# Patient Record
Sex: FEMALE | Race: BLACK OR AFRICAN AMERICAN | NOT HISPANIC OR LATINO
[De-identification: names, ages, dates, MRNs, and addresses within clinical notes are randomized per-mention and may not be internally consistent; named-entity substitution may affect disease eponyms.]

---

## 2017-08-01 ENCOUNTER — APPOINTMENT (OUTPATIENT)
Dept: PULMONOLOGY | Facility: CLINIC | Age: 57
End: 2017-08-01

## 2017-10-03 ENCOUNTER — APPOINTMENT (OUTPATIENT)
Dept: PULMONOLOGY | Facility: CLINIC | Age: 57
End: 2017-10-03
Payer: COMMERCIAL

## 2017-10-03 VITALS
BODY MASS INDEX: 24.86 KG/M2 | TEMPERATURE: 98.3 F | DIASTOLIC BLOOD PRESSURE: 70 MMHG | HEIGHT: 68 IN | SYSTOLIC BLOOD PRESSURE: 130 MMHG | HEART RATE: 78 BPM | WEIGHT: 164 LBS | OXYGEN SATURATION: 95 % | RESPIRATION RATE: 18 BRPM

## 2017-10-03 DIAGNOSIS — K22.8 OTHER SPECIFIED DISEASES OF ESOPHAGUS: ICD-10-CM

## 2017-10-03 PROCEDURE — 99215 OFFICE O/P EST HI 40 MIN: CPT

## 2017-10-05 ENCOUNTER — MOBILE ON CALL (OUTPATIENT)
Age: 57
End: 2017-10-05

## 2017-10-06 ENCOUNTER — CHART COPY (OUTPATIENT)
Age: 57
End: 2017-10-06

## 2017-10-30 ENCOUNTER — APPOINTMENT (OUTPATIENT)
Dept: CV DIAGNOSITCS | Facility: HOSPITAL | Age: 57
End: 2017-10-30

## 2017-10-30 ENCOUNTER — OUTPATIENT (OUTPATIENT)
Dept: OUTPATIENT SERVICES | Facility: HOSPITAL | Age: 57
LOS: 1 days | End: 2017-10-30
Payer: COMMERCIAL

## 2017-10-30 DIAGNOSIS — M34.9 SYSTEMIC SCLEROSIS, UNSPECIFIED: ICD-10-CM

## 2017-10-30 PROCEDURE — 93306 TTE W/DOPPLER COMPLETE: CPT | Mod: 26

## 2017-11-06 ENCOUNTER — APPOINTMENT (OUTPATIENT)
Dept: RADIOLOGY | Facility: CLINIC | Age: 57
End: 2017-11-06
Payer: COMMERCIAL

## 2017-11-06 ENCOUNTER — OUTPATIENT (OUTPATIENT)
Dept: OUTPATIENT SERVICES | Facility: HOSPITAL | Age: 57
LOS: 1 days | End: 2017-11-06
Payer: COMMERCIAL

## 2017-11-06 ENCOUNTER — APPOINTMENT (OUTPATIENT)
Dept: CT IMAGING | Facility: CLINIC | Age: 57
End: 2017-11-06
Payer: COMMERCIAL

## 2017-11-06 DIAGNOSIS — M34.9 SYSTEMIC SCLEROSIS, UNSPECIFIED: ICD-10-CM

## 2017-11-06 DIAGNOSIS — Z00.8 ENCOUNTER FOR OTHER GENERAL EXAMINATION: ICD-10-CM

## 2017-11-06 PROCEDURE — 72100 X-RAY EXAM L-S SPINE 2/3 VWS: CPT | Mod: 26

## 2017-11-06 PROCEDURE — 72070 X-RAY EXAM THORAC SPINE 2VWS: CPT | Mod: 26

## 2017-11-06 PROCEDURE — 71100 X-RAY EXAM RIBS UNI 2 VIEWS: CPT | Mod: 26,RT

## 2017-11-06 PROCEDURE — 72100 X-RAY EXAM L-S SPINE 2/3 VWS: CPT

## 2017-11-06 PROCEDURE — 71100 X-RAY EXAM RIBS UNI 2 VIEWS: CPT

## 2017-11-06 PROCEDURE — 71250 CT THORAX DX C-: CPT | Mod: 26

## 2017-11-06 PROCEDURE — 72070 X-RAY EXAM THORAC SPINE 2VWS: CPT

## 2017-11-06 PROCEDURE — 71250 CT THORAX DX C-: CPT

## 2017-11-10 DIAGNOSIS — R91.8 OTHER NONSPECIFIC ABNORMAL FINDING OF LUNG FIELD: ICD-10-CM

## 2017-11-10 DIAGNOSIS — M51.37 OTHER INTERVERTEBRAL DISC DEGENERATION, LUMBOSACRAL REGION: ICD-10-CM

## 2017-11-10 DIAGNOSIS — M43.16 SPONDYLOLISTHESIS, LUMBAR REGION: ICD-10-CM

## 2017-11-10 DIAGNOSIS — R07.81 PLEURODYNIA: ICD-10-CM

## 2017-11-10 DIAGNOSIS — M54.9 DORSALGIA, UNSPECIFIED: ICD-10-CM

## 2018-01-02 ENCOUNTER — APPOINTMENT (OUTPATIENT)
Dept: PULMONOLOGY | Facility: CLINIC | Age: 58
End: 2018-01-02

## 2018-04-27 ENCOUNTER — APPOINTMENT (OUTPATIENT)
Dept: PULMONOLOGY | Facility: CLINIC | Age: 58
End: 2018-04-27
Payer: COMMERCIAL

## 2018-04-27 VITALS
TEMPERATURE: 98.1 F | HEIGHT: 68 IN | WEIGHT: 168 LBS | SYSTOLIC BLOOD PRESSURE: 110 MMHG | BODY MASS INDEX: 25.46 KG/M2 | DIASTOLIC BLOOD PRESSURE: 80 MMHG

## 2018-04-27 PROCEDURE — 94729 DIFFUSING CAPACITY: CPT

## 2018-04-27 PROCEDURE — 99215 OFFICE O/P EST HI 40 MIN: CPT | Mod: 25

## 2018-04-27 PROCEDURE — 94060 EVALUATION OF WHEEZING: CPT

## 2018-04-27 PROCEDURE — 94726 PLETHYSMOGRAPHY LUNG VOLUMES: CPT

## 2018-04-27 PROCEDURE — ZZZZZ: CPT

## 2018-05-16 ENCOUNTER — APPOINTMENT (OUTPATIENT)
Dept: CARDIOLOGY | Facility: CLINIC | Age: 58
End: 2018-05-16
Payer: COMMERCIAL

## 2018-05-16 ENCOUNTER — NON-APPOINTMENT (OUTPATIENT)
Age: 58
End: 2018-05-16

## 2018-05-16 VITALS
WEIGHT: 173 LBS | DIASTOLIC BLOOD PRESSURE: 84 MMHG | BODY MASS INDEX: 26.22 KG/M2 | OXYGEN SATURATION: 98 % | SYSTOLIC BLOOD PRESSURE: 149 MMHG | HEART RATE: 73 BPM | HEIGHT: 68 IN

## 2018-05-16 DIAGNOSIS — Z82.49 FAMILY HISTORY OF ISCHEMIC HEART DISEASE AND OTHER DISEASES OF THE CIRCULATORY SYSTEM: ICD-10-CM

## 2018-05-16 DIAGNOSIS — I10 ESSENTIAL (PRIMARY) HYPERTENSION: ICD-10-CM

## 2018-05-16 PROCEDURE — 99205 OFFICE O/P NEW HI 60 MIN: CPT

## 2018-05-16 PROCEDURE — 93000 ELECTROCARDIOGRAM COMPLETE: CPT

## 2018-11-02 ENCOUNTER — APPOINTMENT (OUTPATIENT)
Dept: PULMONOLOGY | Facility: CLINIC | Age: 58
End: 2018-11-02
Payer: COMMERCIAL

## 2018-11-02 VITALS
DIASTOLIC BLOOD PRESSURE: 83 MMHG | OXYGEN SATURATION: 100 % | RESPIRATION RATE: 16 BRPM | BODY MASS INDEX: 25.46 KG/M2 | SYSTOLIC BLOOD PRESSURE: 128 MMHG | HEIGHT: 68 IN | TEMPERATURE: 97.7 F | HEART RATE: 76 BPM | WEIGHT: 168 LBS

## 2018-11-02 PROCEDURE — ZZZZZ: CPT

## 2018-11-02 PROCEDURE — 94618 PULMONARY STRESS TESTING: CPT

## 2018-11-02 PROCEDURE — 99215 OFFICE O/P EST HI 40 MIN: CPT | Mod: 25

## 2018-12-12 ENCOUNTER — APPOINTMENT (OUTPATIENT)
Dept: CT IMAGING | Facility: CLINIC | Age: 58
End: 2018-12-12
Payer: COMMERCIAL

## 2018-12-12 ENCOUNTER — OUTPATIENT (OUTPATIENT)
Dept: OUTPATIENT SERVICES | Facility: HOSPITAL | Age: 58
LOS: 1 days | End: 2018-12-12
Payer: COMMERCIAL

## 2018-12-12 DIAGNOSIS — M34.9 SYSTEMIC SCLEROSIS, UNSPECIFIED: ICD-10-CM

## 2018-12-12 PROCEDURE — 71250 CT THORAX DX C-: CPT

## 2018-12-12 PROCEDURE — 71250 CT THORAX DX C-: CPT | Mod: 26

## 2018-12-13 ENCOUNTER — TRANSCRIPTION ENCOUNTER (OUTPATIENT)
Age: 58
End: 2018-12-13

## 2019-03-02 ENCOUNTER — TRANSCRIPTION ENCOUNTER (OUTPATIENT)
Age: 59
End: 2019-03-02

## 2019-03-05 ENCOUNTER — APPOINTMENT (OUTPATIENT)
Dept: PULMONOLOGY | Facility: CLINIC | Age: 59
End: 2019-03-05
Payer: COMMERCIAL

## 2019-03-05 VITALS
RESPIRATION RATE: 15 BRPM | OXYGEN SATURATION: 98 % | DIASTOLIC BLOOD PRESSURE: 79 MMHG | TEMPERATURE: 97.2 F | BODY MASS INDEX: 26 KG/M2 | SYSTOLIC BLOOD PRESSURE: 122 MMHG | HEART RATE: 80 BPM | WEIGHT: 171 LBS

## 2019-03-05 DIAGNOSIS — R00.2 PALPITATIONS: ICD-10-CM

## 2019-03-05 DIAGNOSIS — E03.9 HYPOTHYROIDISM, UNSPECIFIED: ICD-10-CM

## 2019-03-05 PROCEDURE — ZZZZZ: CPT

## 2019-03-05 PROCEDURE — 36415 COLL VENOUS BLD VENIPUNCTURE: CPT

## 2019-03-05 PROCEDURE — 94010 BREATHING CAPACITY TEST: CPT

## 2019-03-05 PROCEDURE — 94729 DIFFUSING CAPACITY: CPT

## 2019-03-05 PROCEDURE — 99215 OFFICE O/P EST HI 40 MIN: CPT | Mod: 25

## 2019-03-05 PROCEDURE — 94726 PLETHYSMOGRAPHY LUNG VOLUMES: CPT

## 2019-03-05 NOTE — REVIEW OF SYSTEMS
[Dry Mouth] : dry mouth [As Noted in HPI] : as noted in HPI [Heartburn] : heartburn [Reflux] : reflux [Myalgias] : myalgias [Arthralgias] : arthralgias [Raynaud] : Raynaud's phenomenon was observed [Scleroderma] : scleroderma [Negative] : Sleep Disorder [Postnasal Drip] : no postnasal drip [Sinus Problems] : no sinus problems [Mouth Ulcers] : no mouth ulcers [Cough] : no cough [PND] : no PND [Orthopnea] : no orthopnea [Edema] : ~T edema was not present

## 2019-03-05 NOTE — REASON FOR VISIT
[Follow-Up] : a follow-up visit [Abnormal CT Scan] : abnormal CT Scan [Shortness of Breath] : shortness of Breath [FreeTextEntry1] : scleroderma [FreeTextEntry2] : Scleroderma

## 2019-03-05 NOTE — DISCUSSION/SUMMARY
[FreeTextEntry1] : -------ASSESSMENT AND PLAN---58 F with scleroderma, GERD , here for evaluation of shortness of breath for pulmonary manifestations of scleroderma also hypothyroidism-..\par 1 Sclerdoerma--follow up with Dr Goldberg\par 2- previous  CT chest unchanged- mild ILD- repeat yearly ---needs  ct scan in 2019\par 3- Hypothyroidism--on armour thyroid  f/u  endocrinology----DR. LAITH VILLASEÑOR----\par 4- repeat echo \par \par 5- labs drawn today\par 6- obtain egd report from Dr Bose\par 7- f/u in 6 m\par \par ------Thanks for allowing  me to participate  in the care of this patient.  Patient at this time  will follow  the above mentioned recommendations and return back for follow up visit. If you have any questions  I can be reached  at [phone]  or  [phone].  \par \par Malu Villaseñor MD, FCCP \par Director, Pulmonary Hypertension Program \par [facility] \par Division of Pulmonary, Critical Care and Sleep Medicine \par  Professor of Medicine \par [employer]\par

## 2019-03-05 NOTE — HISTORY OF PRESENT ILLNESS
[Stable] : are stable [None] : ~He/She~ has no significant interval events [Difficulty Breathing During Exertion] : stable dyspnea on exertion [Feelings Of Weakness On Exertion] : stable exercise intolerance [Cough] : denies coughing [Wheezing] : denies wheezing [Regional Soft Tissue Swelling Both Lower Extremities] : denies lower extremity edema [Chest Pain Or Discomfort] : denies chest pain [Fever] : denies fever [0  -  Nothing at all] : 0, nothing at all [Date: ___] : was performed [unfilled] [Oxygen] : the patient uses no supplemental oxygen [de-identified] : FVC 72, FEV1 71, FEV1/FVC 78, , DLCO 72 [FreeTextEntry1] : ---------58 F with scleroderma ( diagnosed 2008, follows with Dr. Goldberg, not on any treatment), HTN, sickle cell trait here for initial evaluation. ---SHE USED TO F/U  WITH DR PATIÑO  BEFORE  BUT HAS NOT FOLLOWWED UP WITH HIM  FOR A WHILE-   Her scleroderma has been associated with skin manifestations including digital ulcers and Raynaud's, GERD , and now possible pulmonary manifestations. She takes nifedipine for raynaud's and vitamins. She reports pedal edema occasionally. denies orthopnea, PND, cough. -----H/O ANTI ENDOMYSIAL ANTIBODIES POSITIVE?CELAIC ---------------------------SEEING  A 'HOLISTIC IMMUNOLOGIST' \par She has LIMITED exercise capacity mainly due to joint pains and myalgias she reports.---------------\par CT chest Jan 2014 - Minimal subpleural ground glass attenuation in b/l lower lung fields, rt lung calcified granuloma, RML 4mm nodule and 2.6 cm thyroid nodule.-------------\par \par ct chest 12/2108 unchanged-\par \par PFTs---------APR 2018------ FVC 2.54, FEV1 1.85, FEV1/FVC 73, ----------mildly reduced DLCO, normal spirometry , increased RV. \par \par pft 3/2019 normal lung volumes, decreased  dlco\par \par Of note, pt has not followed with Dr. martino for scleroderma. She reports using "holistic medicines" to treat her scleroderma. \par PMD - Dr. Sin, \par \par ECHO   -----OCT 2017--------\par . Normal trileaflet aortic valve.\par 2. Increased relative wall thickness with normal left\par ventricular mass index, consistent with concentric left\par ventricular remodeling.\par 3. Normal left ventricular systolic function. No segmental\par wall motion abnormalities.\par 4. Normal right ventricular size and function.\par \par \par -----9/2017---ENDOSCOPY --SCHATZKI'S  RING, , ESOPHAGEAL OBSTRUCTION.[ DR SUZANNE GUERIN] \par oct 2017 doing well-mild ORTIZ and mild dry cough\par declines influenza vac\par \par \par \par march 2019  Scleroderma- follows rheum Dr Goldberg. -FEELS  THE SAME  ---SLIGHT PALPITATIONS AT TIMES s/p holter  Hypothyroid--  ON ARMOUR THYROID] Follows endo Dr Dionne De Paz

## 2019-03-05 NOTE — LETTER CLOSING
[Sincerely,] : Sincerely, [Malu De Paz MD, FCCP] : Malu De Paz MD, FCCP [Director, Pulmonary Hypertension Program] : Director, Pulmonary Hypertension Program [[facility]] : [facility] [Division of Pulmonary, Critical Care and Sleep Medicine] : Division of Pulmonary, Critical Care and Sleep Medicine [Associate Professor of Medicine] : Associate Professor of Medicine [[facility]] : [facility]

## 2019-03-05 NOTE — PHYSICAL EXAM
[General Appearance - Well Developed] : well developed [Normal Appearance] : normal appearance [Well Groomed] : well groomed [General Appearance - Well Nourished] : well nourished [No Deformities] : no deformities [General Appearance - In No Acute Distress] : no acute distress [Normal Conjunctiva] : the conjunctiva exhibited no abnormalities [II] : II [Jugular Venous Distention Increased] : there was no jugular-venous distention [Heart Sounds] : normal S1 and S2 [Murmurs] : no murmurs present [Respiration, Rhythm And Depth] : normal respiratory rhythm and effort [Exaggerated Use Of Accessory Muscles For Inspiration] : no accessory muscle use [Lesions: ____] : lesions [unfilled] [Abdomen Soft] : soft [Abdomen Tenderness] : non-tender [Abnormal Walk] : normal gait [Gait - Sufficient For Exercise Testing] : the gait was sufficient for exercise testing [Skin Color & Pigmentation] : normal skin color and pigmentation [] : no rash [No Venous Stasis] : no venous stasis [Skin Lesions] : no skin lesions [No Skin Ulcers] : no skin ulcer [No Xanthoma] : no  xanthoma was observed [Deep Tendon Reflexes (DTR)] : deep tendon reflexes were 2+ and symmetric [Sensation] : the sensory exam was normal to light touch and pinprick [No Focal Deficits] : no focal deficits [Impaired Insight] : insight and judgment were intact [Nail Clubbing] : no clubbing of the fingernails [Oriented To Time, Place, And Person] : oriented to person, place, and time [Affect] : the affect was normal [Mood] : the mood was normal [FreeTextEntry1] : narrowed mouth opening, skin tightening

## 2019-03-06 LAB
ALBUMIN SERPL ELPH-MCNC: 4.3 G/DL
ALP BLD-CCNC: 64 U/L
ALT SERPL-CCNC: 16 U/L
ANION GAP SERPL CALC-SCNC: 11 MMOL/L
AST SERPL-CCNC: 22 U/L
BASOPHILS # BLD AUTO: 0.05 K/UL
BASOPHILS NFR BLD AUTO: 0.8 %
BILIRUB SERPL-MCNC: 0.2 MG/DL
BUN SERPL-MCNC: 15 MG/DL
CALCIUM SERPL-MCNC: 9.4 MG/DL
CHLORIDE SERPL-SCNC: 105 MMOL/L
CO2 SERPL-SCNC: 26 MMOL/L
CREAT SERPL-MCNC: 0.56 MG/DL
DEPRECATED KAPPA LC FREE/LAMBDA SER: 1.2 RATIO
EOSINOPHIL # BLD AUTO: 0.09 K/UL
EOSINOPHIL NFR BLD AUTO: 1.5 %
FERRITIN SERPL-MCNC: 67 NG/ML
GLUCOSE SERPL-MCNC: 69 MG/DL
HCT VFR BLD CALC: 39 %
HGB BLD-MCNC: 12.3 G/DL
IGA SER QL IEP: 174 MG/DL
IGG SER QL IEP: 1385 MG/DL
IGM SER QL IEP: 41 MG/DL
IMM GRANULOCYTES NFR BLD AUTO: 0.2 %
KAPPA LC CSF-MCNC: 1.38 MG/DL
KAPPA LC SERPL-MCNC: 1.65 MG/DL
LYMPHOCYTES # BLD AUTO: 2.49 K/UL
LYMPHOCYTES NFR BLD AUTO: 40.8 %
MAN DIFF?: NORMAL
MCHC RBC-ENTMCNC: 25.4 PG
MCHC RBC-ENTMCNC: 31.5 GM/DL
MCV RBC AUTO: 80.4 FL
MONOCYTES # BLD AUTO: 0.52 K/UL
MONOCYTES NFR BLD AUTO: 8.5 %
NEUTROPHILS # BLD AUTO: 2.95 K/UL
NEUTROPHILS NFR BLD AUTO: 48.2 %
PLATELET # BLD AUTO: 256 K/UL
POTASSIUM SERPL-SCNC: 4.3 MMOL/L
PROT SERPL-MCNC: 6.9 G/DL
RBC # BLD: 4.85 M/UL
RBC # FLD: 14.5 %
SODIUM SERPL-SCNC: 142 MMOL/L
TSH SERPL-ACNC: 0.91 UIU/ML
WBC # FLD AUTO: 6.11 K/UL

## 2019-03-07 LAB — 25(OH)D3 SERPL-MCNC: 64.1 NG/ML

## 2019-03-13 LAB — TOTAL IGE SMQN RAST: 27 KU/L

## 2019-04-10 LAB
CREAT 24H UR-MCNC: 1 G/24 H
CREAT ?TM UR-MCNC: 69 MG/DL
PROT 24H UR-MRATE: 29 MG/DL
PROT ?TM UR-MCNC: 24 HR
PROT UR-MCNC: 435 MG/24 H
SPECIMEN VOL 24H UR: 1500 ML

## 2019-05-14 ENCOUNTER — APPOINTMENT (OUTPATIENT)
Dept: CV DIAGNOSITCS | Facility: HOSPITAL | Age: 59
End: 2019-05-14
Payer: COMMERCIAL

## 2019-05-14 ENCOUNTER — OUTPATIENT (OUTPATIENT)
Dept: OUTPATIENT SERVICES | Facility: HOSPITAL | Age: 59
LOS: 1 days | End: 2019-05-14

## 2019-05-14 DIAGNOSIS — M34.9 SYSTEMIC SCLEROSIS, UNSPECIFIED: ICD-10-CM

## 2019-05-14 PROCEDURE — 93306 TTE W/DOPPLER COMPLETE: CPT | Mod: 26

## 2019-10-08 ENCOUNTER — APPOINTMENT (OUTPATIENT)
Dept: PULMONOLOGY | Facility: CLINIC | Age: 59
End: 2019-10-08
Payer: COMMERCIAL

## 2019-10-08 VITALS
SYSTOLIC BLOOD PRESSURE: 147 MMHG | BODY MASS INDEX: 25.24 KG/M2 | TEMPERATURE: 97.8 F | RESPIRATION RATE: 16 BRPM | WEIGHT: 166 LBS | DIASTOLIC BLOOD PRESSURE: 78 MMHG | OXYGEN SATURATION: 96 % | HEART RATE: 64 BPM

## 2019-10-08 DIAGNOSIS — K21.9 GASTRO-ESOPHAGEAL REFLUX DISEASE W/OUT ESOPHAGITIS: ICD-10-CM

## 2019-10-08 PROCEDURE — 99215 OFFICE O/P EST HI 40 MIN: CPT | Mod: 25

## 2019-10-08 PROCEDURE — 36415 COLL VENOUS BLD VENIPUNCTURE: CPT

## 2019-10-08 NOTE — PHYSICAL EXAM
[Normal Appearance] : normal appearance [General Appearance - Well Developed] : well developed [Well Groomed] : well groomed [General Appearance - Well Nourished] : well nourished [No Deformities] : no deformities [General Appearance - In No Acute Distress] : no acute distress [Normal Conjunctiva] : the conjunctiva exhibited no abnormalities [II] : II [Jugular Venous Distention Increased] : there was no jugular-venous distention [Heart Sounds] : normal S1 and S2 [Murmurs] : no murmurs present [Respiration, Rhythm And Depth] : normal respiratory rhythm and effort [Exaggerated Use Of Accessory Muscles For Inspiration] : no accessory muscle use [Lesions: ____] : lesions [unfilled] [Abdomen Soft] : soft [Abdomen Tenderness] : non-tender [Abnormal Walk] : normal gait [Gait - Sufficient For Exercise Testing] : the gait was sufficient for exercise testing [Skin Color & Pigmentation] : normal skin color and pigmentation [No Venous Stasis] : no venous stasis [] : no rash [No Skin Ulcers] : no skin ulcer [Skin Lesions] : no skin lesions [No Xanthoma] : no  xanthoma was observed [Deep Tendon Reflexes (DTR)] : deep tendon reflexes were 2+ and symmetric [No Focal Deficits] : no focal deficits [Sensation] : the sensory exam was normal to light touch and pinprick [Impaired Insight] : insight and judgment were intact [Nail Clubbing] : no clubbing of the fingernails [Oriented To Time, Place, And Person] : oriented to person, place, and time [Mood] : the mood was normal [Affect] : the affect was normal [FreeTextEntry1] : skin tightening, old healed digital ulcers

## 2019-10-08 NOTE — HISTORY OF PRESENT ILLNESS
[Stable] : are stable [None] : ~He/She~ has no significant interval events [Difficulty Breathing During Exertion] : stable dyspnea on exertion [Feelings Of Weakness On Exertion] : stable exercise intolerance [Wheezing] : denies wheezing [Cough] : denies coughing [Regional Soft Tissue Swelling Both Lower Extremities] : denies lower extremity edema [Chest Pain Or Discomfort] : denies chest pain [Fever] : denies fever [0  -  Nothing at all] : 0, nothing at all [Date: ___] : was performed [unfilled] [Oxygen] : the patient uses no supplemental oxygen [de-identified] : FVC 72, FEV1 71, FEV1/FVC 78, , DLCO 72 [FreeTextEntry1] : ---------58 F with scleroderma ( diagnosed 2008, follows with Dr. Goldberg, not on any treatment), HTN, sickle cell trait here for initial evaluation. ---SHE USED TO F/U  WITH DR PATIÑO  BEFORE  BUT HAS NOT FOLLOWWED UP WITH HIM  FOR A WHILE-   Her scleroderma has been associated with skin manifestations including digital ulcers and Raynaud's, GERD , and now possible pulmonary manifestations. She takes nifedipine for raynaud's and vitamins. She reports pedal edema occasionally. denies orthopnea, PND, cough. -----H/O ANTI ENDOMYSIAL ANTIBODIES POSITIVE?CELAIC ---------------------------SEEING  A 'HOLISTIC IMMUNOLOGIST' \par She has LIMITED exercise capacity mainly due to joint pains and myalgias she reports.---------------\par CT chest Jan 2014 - Minimal subpleural ground glass attenuation in b/l lower lung fields, rt lung calcified granuloma, RML 4mm nodule and 2.6 cm thyroid nodule.-------------\par \par ct chest 12/2108 unchanged-\par \par PFTs---------APR 2018------ FVC 2.54, FEV1 1.85, FEV1/FVC 73, ----------mildly reduced DLCO, normal spirometry , increased RV. \par \par pft 3/2019 normal lung volumes, decreased  dlco\par \par Of note, pt has not followed with Dr. martino for scleroderma. She reports using "holistic medicines" to treat her scleroderma. \par PMD - Dr. Sin, \par \par ECHO   -----OCT 2017--------\par . Normal trileaflet aortic valve.\par 2. Increased relative wall thickness with normal left\par ventricular mass index, consistent with concentric left\par ventricular remodeling.\par 3. Normal left ventricular systolic function. No segmental\par wall motion abnormalities.\par 4. Normal right ventricular size and function.\par \par \par -----9/2017---ENDOSCOPY --SCHATZKI'S  RING, , ESOPHAGEAL OBSTRUCTION.[ DR SUZANNE GUERIN] \par oct 2017 doing well-mild ORTIZ and mild dry cough\par declines influenza vac\par \par \par \par october 2019  Scleroderma- follows rheum Dr Goldberg. -FEELS  THE SAME  -  Hypothyroid--  ON ARMOUR THYROID] Follows endo Dr Dionne De Paz

## 2019-10-08 NOTE — LETTER CLOSING
[Sincerely,] : Sincerely, [Malu De Paz MD, FCCP] : Malu De Paz MD, FCCP [Director, Pulmonary Hypertension Program] : Director, Pulmonary Hypertension Program [[facility]] : [facility] [Division of Pulmonary, Critical Care and Sleep Medicine] : Division of Pulmonary, Critical Care and Sleep Medicine [Associate Professor of Medicine] : Associate Professor of Medicine [[facility]] : [facility]

## 2019-10-08 NOTE — REVIEW OF SYSTEMS
[Dry Mouth] : dry mouth [As Noted in HPI] : as noted in HPI [Heartburn] : heartburn [Reflux] : reflux [Myalgias] : myalgias [Arthralgias] : arthralgias [Raynaud] : Raynaud's phenomenon was observed [Scleroderma] : scleroderma [Negative] : Sleep Disorder [Postnasal Drip] : no postnasal drip [Mouth Ulcers] : no mouth ulcers [Sinus Problems] : no sinus problems [Cough] : no cough [PND] : no PND [Orthopnea] : no orthopnea [Edema] : ~T edema was not present

## 2019-10-08 NOTE — DISCUSSION/SUMMARY
[FreeTextEntry1] : -------ASSESSMENT AND PLAN---59 F with scleroderma, GERD , here for evaluation of shortness of breath for pulmonary manifestations of scleroderma also hypothyroidism-..\par 1 Sclerdoerma--follow up with Dr Goldberg\par 2- previous  CT chest unchanged- mild ILD- repeat yearly --\par 3- Hypothyroidism--on armour thyroid  f/u  endocrinology----DR. LAITH VILLASEÑOR----\par 4- repeat echo 2020\par \par 5- labs drawn today\par 6- declined influenza vac\par 7- f/u in 6 m\par \par ------Thanks for allowing  me to participate  in the care of this patient.  Patient at this time  will follow  the above mentioned recommendations and return back for follow up visit. If you have any questions  I can be reached  at [phone]  or  [phone].  \par \par Malu Villaseñor MD, FCCP \par Director, Pulmonary Hypertension Program \par [facility] \par Division of Pulmonary, Critical Care and Sleep Medicine \par  Professor of Medicine \par [employer]\par

## 2019-10-08 NOTE — REASON FOR VISIT
[Abnormal CT Scan] : abnormal CT Scan [Follow-Up] : a follow-up visit [Shortness of Breath] : shortness of Breath [FreeTextEntry2] : Scleroderma [FreeTextEntry1] : scleroderma

## 2019-10-09 LAB
ALBUMIN SERPL ELPH-MCNC: 4.4 G/DL
ALP BLD-CCNC: 61 U/L
ALT SERPL-CCNC: 15 U/L
ANION GAP SERPL CALC-SCNC: 10 MMOL/L
AST SERPL-CCNC: 20 U/L
BASOPHILS # BLD AUTO: 0.05 K/UL
BASOPHILS NFR BLD AUTO: 0.7 %
BILIRUB SERPL-MCNC: 0.2 MG/DL
BUN SERPL-MCNC: 13 MG/DL
CALCIUM SERPL-MCNC: 9.5 MG/DL
CHLORIDE SERPL-SCNC: 105 MMOL/L
CO2 SERPL-SCNC: 26 MMOL/L
CREAT SERPL-MCNC: 0.56 MG/DL
EOSINOPHIL # BLD AUTO: 0.34 K/UL
EOSINOPHIL NFR BLD AUTO: 4.7 %
GLUCOSE SERPL-MCNC: 87 MG/DL
HCT VFR BLD CALC: 39.5 %
HGB BLD-MCNC: 12.4 G/DL
IMM GRANULOCYTES NFR BLD AUTO: 0.3 %
LYMPHOCYTES # BLD AUTO: 2.35 K/UL
LYMPHOCYTES NFR BLD AUTO: 32.4 %
MAN DIFF?: NORMAL
MCHC RBC-ENTMCNC: 25.4 PG
MCHC RBC-ENTMCNC: 31.4 GM/DL
MCV RBC AUTO: 80.8 FL
MONOCYTES # BLD AUTO: 0.58 K/UL
MONOCYTES NFR BLD AUTO: 8 %
NEUTROPHILS # BLD AUTO: 3.91 K/UL
NEUTROPHILS NFR BLD AUTO: 53.9 %
PLATELET # BLD AUTO: 246 K/UL
POTASSIUM SERPL-SCNC: 4.6 MMOL/L
PROT SERPL-MCNC: 6.8 G/DL
RBC # BLD: 4.89 M/UL
RBC # FLD: 14.8 %
SODIUM SERPL-SCNC: 141 MMOL/L
WBC # FLD AUTO: 7.25 K/UL

## 2019-10-09 RX ORDER — NALTREXONE HYDROCHLORIDE 50 MG/1
TABLET, FILM COATED ORAL
Refills: 0 | Status: ACTIVE | COMMUNITY

## 2019-10-09 RX ORDER — ASPIRIN/ACETAMINOPHEN/CAFFEINE 500-325-65
POWDER IN PACKET (EA) ORAL
Refills: 0 | Status: ACTIVE | COMMUNITY

## 2019-10-09 RX ORDER — MELOXICAM 15 MG/1
TABLET ORAL
Refills: 0 | Status: ACTIVE | COMMUNITY

## 2019-10-09 RX ORDER — ASCORBIC ACID 500 MG
TABLET,CHEWABLE ORAL
Refills: 0 | Status: ACTIVE | COMMUNITY

## 2019-10-09 RX ORDER — RAMIPRIL 5 MG/1
5 CAPSULE ORAL
Refills: 0 | Status: ACTIVE | COMMUNITY

## 2019-10-09 RX ORDER — TURMERIC 95 %
POWDER (GRAM) MISCELLANEOUS
Refills: 0 | Status: ACTIVE | COMMUNITY

## 2019-10-09 RX ORDER — OMEPRAZOLE 20 MG/1
20 CAPSULE, DELAYED RELEASE ORAL
Refills: 0 | Status: ACTIVE | COMMUNITY

## 2020-03-17 ENCOUNTER — OUTPATIENT (OUTPATIENT)
Dept: OUTPATIENT SERVICES | Facility: HOSPITAL | Age: 60
LOS: 1 days | End: 2020-03-17

## 2020-03-17 ENCOUNTER — APPOINTMENT (OUTPATIENT)
Dept: CV DIAGNOSITCS | Facility: HOSPITAL | Age: 60
End: 2020-03-17
Payer: COMMERCIAL

## 2020-03-17 DIAGNOSIS — M34.9 SYSTEMIC SCLEROSIS, UNSPECIFIED: ICD-10-CM

## 2020-03-17 PROCEDURE — 93306 TTE W/DOPPLER COMPLETE: CPT | Mod: 26

## 2020-03-24 ENCOUNTER — APPOINTMENT (OUTPATIENT)
Dept: PULMONOLOGY | Facility: CLINIC | Age: 60
End: 2020-03-24

## 2020-10-19 ENCOUNTER — OUTPATIENT (OUTPATIENT)
Dept: OUTPATIENT SERVICES | Facility: HOSPITAL | Age: 60
LOS: 1 days | End: 2020-10-19

## 2020-10-19 DIAGNOSIS — Z00.8 ENCOUNTER FOR OTHER GENERAL EXAMINATION: ICD-10-CM

## 2020-10-23 ENCOUNTER — RESULT REVIEW (OUTPATIENT)
Age: 60
End: 2020-10-23

## 2020-10-23 ENCOUNTER — OUTPATIENT (OUTPATIENT)
Dept: OUTPATIENT SERVICES | Facility: HOSPITAL | Age: 60
LOS: 1 days | End: 2020-10-23
Payer: COMMERCIAL

## 2020-10-23 ENCOUNTER — APPOINTMENT (OUTPATIENT)
Dept: CT IMAGING | Facility: CLINIC | Age: 60
End: 2020-10-23
Payer: COMMERCIAL

## 2020-10-23 DIAGNOSIS — R06.09 OTHER FORMS OF DYSPNEA: ICD-10-CM

## 2020-10-23 PROCEDURE — 71250 CT THORAX DX C-: CPT

## 2020-10-23 PROCEDURE — 71250 CT THORAX DX C-: CPT | Mod: 26

## 2020-10-26 ENCOUNTER — NON-APPOINTMENT (OUTPATIENT)
Age: 60
End: 2020-10-26

## 2020-10-29 DIAGNOSIS — R91.8 OTHER NONSPECIFIC ABNORMAL FINDING OF LUNG FIELD: ICD-10-CM

## 2020-10-29 DIAGNOSIS — I70.0 ATHEROSCLEROSIS OF AORTA: ICD-10-CM

## 2020-11-04 NOTE — LETTER CLOSING
[Sincerely,] : Sincerely, [Malu De Paz MD, FCCP] : Malu De Paz MD, FCCP [Director, Pulmonary Hypertension Program] : Director, Pulmonary Hypertension Program [[facility]] : [facility] [Division of Pulmonary, Critical Care and Sleep Medicine] : Division of Pulmonary, Critical Care and Sleep Medicine [Associate Professor of Medicine] : Associate Professor of Medicine [[facility]] : [facility]

## 2020-11-05 ENCOUNTER — APPOINTMENT (OUTPATIENT)
Dept: PULMONOLOGY | Facility: CLINIC | Age: 60
End: 2020-11-05
Payer: COMMERCIAL

## 2020-11-05 VITALS
WEIGHT: 170 LBS | TEMPERATURE: 97.7 F | DIASTOLIC BLOOD PRESSURE: 81 MMHG | HEIGHT: 68 IN | SYSTOLIC BLOOD PRESSURE: 125 MMHG | BODY MASS INDEX: 25.76 KG/M2 | HEART RATE: 70 BPM | RESPIRATION RATE: 16 BRPM

## 2020-11-05 PROCEDURE — 99072 ADDL SUPL MATRL&STAF TM PHE: CPT

## 2020-11-05 PROCEDURE — 36415 COLL VENOUS BLD VENIPUNCTURE: CPT

## 2020-11-05 PROCEDURE — 99215 OFFICE O/P EST HI 40 MIN: CPT | Mod: 25

## 2020-11-05 RX ORDER — AZITHROMYCIN 250 MG/1
250 TABLET, FILM COATED ORAL
Qty: 1 | Refills: 0 | Status: DISCONTINUED | COMMUNITY
Start: 2020-03-23 | End: 2020-11-05

## 2020-11-05 NOTE — HISTORY OF PRESENT ILLNESS
[Stable] : are stable [None] : ~He/She~ has no significant interval events [Difficulty Breathing During Exertion] : stable dyspnea on exertion [Feelings Of Weakness On Exertion] : stable exercise intolerance [Cough] : denies coughing [Wheezing] : denies wheezing [Regional Soft Tissue Swelling Both Lower Extremities] : denies lower extremity edema [Chest Pain Or Discomfort] : denies chest pain [Fever] : denies fever [0  -  Nothing at all] : 0, nothing at all [Date: ___] : was performed [unfilled] [Oxygen] : the patient uses no supplemental oxygen [de-identified] : FVC 72, FEV1 71, FEV1/FVC 78, , DLCO 72 [FreeTextEntry1] : --------60 F with scleroderma ( diagnosed 2008, follows with Dr. Goldberg, not on any treatment), HTN, sickle cell trait here for initial evaluation. ---SHE USED TO F/U  WITH DR PATIÑO  BEFORE  BUT HAS NOT FOLLOWWED UP WITH HIM  FOR A WHILE-   Her scleroderma has been associated with skin manifestations including digital ulcers and Raynaud's, GERD , and now possible pulmonary manifestations. She takes nifedipine for raynaud's and vitamins. She reports pedal edema occasionally. denies orthopnea, PND, cough. -----H/O ANTI ENDOMYSIAL ANTIBODIES POSITIVE?CELAIC ---------------------------SEEING  A 'HOLISTIC IMMUNOLOGIST' \par She has LIMITED exercise capacity mainly due to joint pains and myalgias she reports.---------------\par CT chest Jan 2014 - Minimal subpleural ground glass attenuation in b/l lower lung fields, rt lung calcified granuloma, RML 4mm nodule and 2.6 cm thyroid nodule.-------------\par \par ct chest 12/2108 unchanged-\par \par PFTs---------APR 2018------ FVC 2.54, FEV1 1.85, FEV1/FVC 73, ----------mildly reduced DLCO, normal spirometry , increased RV. \par \par pft 3/2019 normal lung volumes, decreased  dlco\par \par Of note, pt has not followed with Dr. martino for scleroderma. She reports using "holistic medicines" to treat her scleroderma. \par PMD - Dr. Sin, \par \par ECHO   -----OCT 2017--------\par . Normal trileaflet aortic valve.\par 2. Increased relative wall thickness with normal left\par ventricular mass index, consistent with concentric left\par ventricular remodeling.\par 3. Normal left ventricular systolic function. No segmental\par wall motion abnormalities.\par 4. Normal right ventricular size and function.\par \par \par echo 7/202 normal study\par ct chest 10/2020 IMPRESSION:\par Bilateral lower lobe reticular and groundglass opacities with subpleural sparing are increased compared to prior studies. No traction bronchiectasis or honeycombing\par \par -----9/2017---ENDOSCOPY --SCHATZKI'S  RING, , ESOPHAGEAL OBSTRUCTION.[ DR SUZANNE GUERIN] \par oct 2017 doing well-mild ORTIZ and mild dry cough\par declines influenza vac\par \par \par nov 2020   Scleroderma- follows rheum Dr Goldberg. -FEELS  THE SAME  -  Hypothyroid--  ON ARMOUR THYROID] Follows endo Dr Dionne De Paz

## 2020-11-05 NOTE — DISCUSSION/SUMMARY
[FreeTextEntry1] : -------ASSESSMENT AND PLAN---59 F with scleroderma, GERD , here for evaluation of shortness of breath for pulmonary manifestations of scleroderma also hypothyroidism-..\par 1 Sclerdoerma--follow up with Dr Goldberg\par 2- previous  CT chest unchanged- mild ILD- repeat yearly --\par 3- Hypothyroidism--on armour thyroid  f/u  endocrinology----DR. LAITH VILLASEÑOR----\par 4- has LYDIA- on cpap but not working well- will obtain previous studies for me to review and repeat psg\par 5- labs drawn today\par 6- declined influenza vac\par 7- needs pft\par 8- f/u in 6-8 weeks\par \par ------Thanks for allowing  me to participate  in the care of this patient.  Patient at this time  will follow  the above mentioned recommendations and return back for follow up visit. If you have any questions  I can be reached  at [phone]  or  [phone].  \par \par Malu Villaseñor MD, FCCP \par Director, Pulmonary Hypertension Program \par [facility] \par Division of Pulmonary, Critical Care and Sleep Medicine \par  Professor of Medicine \par [employer]\par

## 2020-11-05 NOTE — REASON FOR VISIT
[Abnormal CT Scan] : abnormal CT Scan [Shortness of Breath] : shortness of Breath [Follow-Up] : a follow-up visit [FreeTextEntry1] : scleroderma [FreeTextEntry2] : Scleroderma

## 2020-11-06 LAB
ALBUMIN SERPL ELPH-MCNC: 4.7 G/DL
ALP BLD-CCNC: 61 U/L
ALT SERPL-CCNC: 20 U/L
ANION GAP SERPL CALC-SCNC: 14 MMOL/L
AST SERPL-CCNC: 25 U/L
BASOPHILS # BLD AUTO: 0.05 K/UL
BASOPHILS NFR BLD AUTO: 0.9 %
BILIRUB SERPL-MCNC: 0.3 MG/DL
BUN SERPL-MCNC: 15 MG/DL
CALCIUM SERPL-MCNC: 9.9 MG/DL
CHLORIDE SERPL-SCNC: 104 MMOL/L
CO2 SERPL-SCNC: 24 MMOL/L
CREAT SERPL-MCNC: 0.61 MG/DL
DEPRECATED KAPPA LC FREE/LAMBDA SER: 1.45 RATIO
EOSINOPHIL # BLD AUTO: 0.09 K/UL
EOSINOPHIL NFR BLD AUTO: 1.5 %
ESTIMATED AVERAGE GLUCOSE: 114 MG/DL
FERRITIN SERPL-MCNC: 66 NG/ML
GLUCOSE SERPL-MCNC: 70 MG/DL
HBA1C MFR BLD HPLC: 5.6 %
HCT VFR BLD CALC: 42 %
HGB BLD-MCNC: 12.9 G/DL
IGA SER QL IEP: 203 MG/DL
IGG SER QL IEP: 1429 MG/DL
IGM SER QL IEP: 42 MG/DL
IMM GRANULOCYTES NFR BLD AUTO: 0.2 %
KAPPA LC CSF-MCNC: 1.4 MG/DL
KAPPA LC SERPL-MCNC: 2.03 MG/DL
LYMPHOCYTES # BLD AUTO: 2.2 K/UL
LYMPHOCYTES NFR BLD AUTO: 37.7 %
MAN DIFF?: NORMAL
MCHC RBC-ENTMCNC: 25.3 PG
MCHC RBC-ENTMCNC: 30.7 GM/DL
MCV RBC AUTO: 82.5 FL
MONOCYTES # BLD AUTO: 0.5 K/UL
MONOCYTES NFR BLD AUTO: 8.6 %
NEUTROPHILS # BLD AUTO: 2.99 K/UL
NEUTROPHILS NFR BLD AUTO: 51.1 %
PLATELET # BLD AUTO: 239 K/UL
POTASSIUM SERPL-SCNC: 4 MMOL/L
PROT SERPL-MCNC: 7.1 G/DL
RBC # BLD: 5.09 M/UL
RBC # FLD: 15 %
SODIUM SERPL-SCNC: 142 MMOL/L
T3FREE SERPL-MCNC: 3.08 PG/ML
T4 FREE SERPL-MCNC: 1.3 NG/DL
TOTAL IGE SMQN RAST: 25 KU/L
TSH SERPL-ACNC: 0.72 UIU/ML
WBC # FLD AUTO: 5.84 K/UL

## 2020-11-09 LAB
SARS-COV-2 IGG SERPL IA-ACNC: <0.1 INDEX
SARS-COV-2 IGG SERPL QL IA: NEGATIVE

## 2020-11-10 RX ORDER — AZELASTINE HYDROCHLORIDE 137 UG/1
0.1 SPRAY, METERED NASAL TWICE DAILY
Qty: 1 | Refills: 1 | Status: ACTIVE | COMMUNITY
Start: 2020-11-10 | End: 1900-01-01

## 2020-12-11 ENCOUNTER — APPOINTMENT (OUTPATIENT)
Dept: DISASTER EMERGENCY | Facility: CLINIC | Age: 60
End: 2020-12-11

## 2020-12-11 DIAGNOSIS — Z01.818 ENCOUNTER FOR OTHER PREPROCEDURAL EXAMINATION: ICD-10-CM

## 2020-12-15 ENCOUNTER — APPOINTMENT (OUTPATIENT)
Dept: PULMONOLOGY | Facility: CLINIC | Age: 60
End: 2020-12-15

## 2021-03-05 ENCOUNTER — APPOINTMENT (OUTPATIENT)
Dept: SLEEP CENTER | Facility: CLINIC | Age: 61
End: 2021-03-05

## 2021-06-14 ENCOUNTER — APPOINTMENT (OUTPATIENT)
Dept: PULMONOLOGY | Facility: CLINIC | Age: 61
End: 2021-06-14
Payer: COMMERCIAL

## 2021-06-14 VITALS
RESPIRATION RATE: 16 BRPM | TEMPERATURE: 97.8 F | SYSTOLIC BLOOD PRESSURE: 157 MMHG | OXYGEN SATURATION: 99 % | WEIGHT: 177 LBS | BODY MASS INDEX: 26.83 KG/M2 | HEART RATE: 78 BPM | HEIGHT: 68 IN | DIASTOLIC BLOOD PRESSURE: 84 MMHG

## 2021-06-14 PROCEDURE — 99072 ADDL SUPL MATRL&STAF TM PHE: CPT

## 2021-06-14 PROCEDURE — 99215 OFFICE O/P EST HI 40 MIN: CPT

## 2021-06-14 RX ORDER — BUDESONIDE 0.5 MG/2ML
0.5 INHALANT ORAL TWICE DAILY
Qty: 60 | Refills: 5 | Status: ACTIVE | COMMUNITY
Start: 2021-06-14 | End: 1900-01-01

## 2021-06-14 NOTE — HISTORY OF PRESENT ILLNESS
[Stable] : are stable [None] : ~He/She~ has no significant interval events [Difficulty Breathing During Exertion] : stable dyspnea on exertion [Feelings Of Weakness On Exertion] : stable exercise intolerance [Cough] : denies coughing [Wheezing] : denies wheezing [Regional Soft Tissue Swelling Both Lower Extremities] : denies lower extremity edema [Chest Pain Or Discomfort] : denies chest pain [Fever] : denies fever [0  -  Nothing at all] : 0, nothing at all [Date: ___] : was performed [unfilled] [TextBox_4] : -60 F with scleroderma ( diagnosed 2008, follows with Dr. Goldberg, not on any treatment), HTN, sickle cell trait here for initial evaluation. ---SHE USED TO F/U  WITH DR PATIÑO  BEFORE  BUT HAS NOT FOLLOWWED UP WITH HIM  FOR A WHILE-   Her scleroderma has been associated with skin manifestations including digital ulcers and Raynaud's, GERD , and now possible pulmonary manifestations. She takes nifedipine for raynaud's and vitamins. She reports pedal edema occasionally. denies orthopnea, PND, cough. -----H/O ANTI ENDOMYSIAL ANTIBODIES POSITIVE?CELAIC ---------------------------SEEING  A 'HOLISTIC IMMUNOLOGIST' \par She has LIMITED exercise capacity mainly due to joint pains and myalgias she reports.---------------\par CT chest Jan 2014 - Minimal subpleural ground glass attenuation in b/l lower lung fields, rt lung calcified granuloma, RML 4mm nodule and 2.6 cm thyroid nodule.-------------\par \par ct chest 12/2108 unchanged-\par \par PFTs---------APR 2018------ FVC 2.54, FEV1 1.85, FEV1/FVC 73, ----------mildly reduced DLCO, normal spirometry , increased RV. \par \par pft 3/2019 normal lung volumes, decreased  dlco\par \par Of note, pt has not followed with Dr. martino for scleroderma. She reports using "holistic medicines" to treat her scleroderma. \par PMD - Dr. Sin, \par \par ECHO   -----OCT 2017--------\par . Normal trileaflet aortic valve.\par 2. Increased relative wall thickness with normal left\par ventricular mass index, consistent with concentric left\par ventricular remodeling.\par 3. Normal left ventricular systolic function. No segmental\par wall motion abnormalities.\par 4. Normal right ventricular size and function.\par \par    CPAP 17930----PT NAGHAVI---HAD A CPAP STUDY AT HIS OFFICE AND WAS TOLD SHE SHOULD BE ON CPAP 12 CM OF H2O------\par \par echo 7/202 normal study\par ct chest 10/2020 IMPRESSION:\par Bilateral lower lobe reticular and groundglass opacities with subpleural sparing are increased compared to prior studies. No traction bronchiectasis or honeycombing\par \par -----9/2017---ENDOSCOPY --SCHATZKI'S  RING, , ESOPHAGEAL OBSTRUCTION.[ DR SUZANNE GUERIN] \par oct 2017 doing well-mild ORTIZ and mild dry cough\par declines influenza vac\par \par \par nov 2020   Scleroderma- follows rheum Dr Goldberg. -FEELS  THE SAME  -  Hypothyroid--  ON ARMOUR THYROID] Follows endo Dr Dionne De Paz \par JUNE 2021--FEELS BETTER ON CPAP 12 AND  NEBULIZER--------NEEDS CT CHEST---   ECHO DR PRATHER [Oxygen] : the patient uses no supplemental oxygen [de-identified] : FVC 72, FEV1 71, FEV1/FVC 78, , DLCO 72 [FreeTextEntry1] : -------

## 2021-06-14 NOTE — PHYSICAL EXAM
[Normal Appearance] : normal appearance [General Appearance - Well Developed] : well developed [Well Groomed] : well groomed [General Appearance - Well Nourished] : well nourished [No Deformities] : no deformities [General Appearance - In No Acute Distress] : no acute distress [Normal Conjunctiva] : the conjunctiva exhibited no abnormalities [II] : II [Jugular Venous Distention Increased] : there was no jugular-venous distention [Heart Sounds] : normal S1 and S2 [Murmurs] : no murmurs present [Respiration, Rhythm And Depth] : normal respiratory rhythm and effort [Exaggerated Use Of Accessory Muscles For Inspiration] : no accessory muscle use [Lesions: ____] : lesions [unfilled] [Abdomen Soft] : soft [Abdomen Tenderness] : non-tender [Abnormal Walk] : normal gait [Gait - Sufficient For Exercise Testing] : the gait was sufficient for exercise testing [Skin Color & Pigmentation] : normal skin color and pigmentation [] : no rash [No Venous Stasis] : no venous stasis [Skin Lesions] : no skin lesions [No Skin Ulcers] : no skin ulcer [No Xanthoma] : no  xanthoma was observed [Deep Tendon Reflexes (DTR)] : deep tendon reflexes were 2+ and symmetric [Sensation] : the sensory exam was normal to light touch and pinprick [No Focal Deficits] : no focal deficits [Impaired Insight] : insight and judgment were intact [Nail Clubbing] : no clubbing of the fingernails [Oriented To Time, Place, And Person] : oriented to person, place, and time [Affect] : the affect was normal [Mood] : the mood was normal [FreeTextEntry1] : narrowed mouth opening, skin tightening

## 2021-06-14 NOTE — DISCUSSION/SUMMARY
[FreeTextEntry1] : -------ASSESSMENT AND PLAN---59 F with scleroderma, GERD , here for evaluation of shortness of breath for pulmonary manifestations of scleroderma also hypothyroidism-..\par 1 Sclerdoerma--follow up with Dr Goldberg\par 2- previous  CT chest unchanged- mild ILD- repeat yearly --\par 3- Hypothyroidism--on armour thyroid  f/u  endocrinology----DR. LAITH VILLASEÑOR----\par 4- has LYDIA- on cpap but not working well- will obtain previous studies for me to review and repeat psg\par 5- labs drawn today\par 6- declined influenza vac\par 7- needs pft\par 8- HYPEREACTIVE AIRWASY --NEDS  LEV ALBUTEROL AND PULA GENA NEBULIZER- f/u in 6-8 weeks\par \par ------Thanks for allowing  me to participate  in the care of this patient.  Patient at this time  will follow  the above mentioned recommendations and return back for follow up visit. If you have any questions  I can be reached  at [phone]  or  [phone].  \par \par Malu Villaseñor MD, FCCP \par Director, Pulmonary Hypertension Program \par [facility] \par Division of Pulmonary, Critical Care and Sleep Medicine \par  Professor of Medicine \par [employer]\par

## 2021-06-14 NOTE — LETTER CLOSING
[Sincerely,] : Sincerely, [Malu De Paz MD, FCCP] : Malu De Paz MD, FCCP [Director, Pulmonary Hypertension Program] : Director, Pulmonary Hypertension Program [[facility]] : [facility] [Division of Pulmonary, Critical Care and Sleep Medicine] : Division of Pulmonary, Critical Care and Sleep Medicine [Associate Professor of Medicine] : Associate Professor of Medicine [[facility]] : [facility]

## 2021-09-14 ENCOUNTER — APPOINTMENT (OUTPATIENT)
Dept: DISASTER EMERGENCY | Facility: CLINIC | Age: 61
End: 2021-09-14

## 2021-09-15 LAB — SARS-COV-2 N GENE NPH QL NAA+PROBE: NOT DETECTED

## 2021-09-17 ENCOUNTER — APPOINTMENT (OUTPATIENT)
Dept: SLEEP CENTER | Facility: CLINIC | Age: 61
End: 2021-09-17
Payer: COMMERCIAL

## 2021-09-17 ENCOUNTER — OUTPATIENT (OUTPATIENT)
Dept: OUTPATIENT SERVICES | Facility: HOSPITAL | Age: 61
LOS: 1 days | End: 2021-09-17
Payer: COMMERCIAL

## 2021-09-17 PROCEDURE — 95810 POLYSOM 6/> YRS 4/> PARAM: CPT | Mod: 26

## 2021-09-17 PROCEDURE — 95810 POLYSOM 6/> YRS 4/> PARAM: CPT

## 2021-09-20 ENCOUNTER — OUTPATIENT (OUTPATIENT)
Dept: OUTPATIENT SERVICES | Facility: HOSPITAL | Age: 61
LOS: 1 days | End: 2021-09-20
Payer: COMMERCIAL

## 2021-09-20 ENCOUNTER — APPOINTMENT (OUTPATIENT)
Dept: CT IMAGING | Facility: CLINIC | Age: 61
End: 2021-09-20
Payer: COMMERCIAL

## 2021-09-20 DIAGNOSIS — M34.9 SYSTEMIC SCLEROSIS, UNSPECIFIED: ICD-10-CM

## 2021-09-20 DIAGNOSIS — G47.33 OBSTRUCTIVE SLEEP APNEA (ADULT) (PEDIATRIC): ICD-10-CM

## 2021-09-20 DIAGNOSIS — Z00.8 ENCOUNTER FOR OTHER GENERAL EXAMINATION: ICD-10-CM

## 2021-09-20 PROCEDURE — 71250 CT THORAX DX C-: CPT | Mod: 26

## 2021-09-20 PROCEDURE — 71250 CT THORAX DX C-: CPT

## 2021-11-09 ENCOUNTER — APPOINTMENT (OUTPATIENT)
Dept: PULMONOLOGY | Facility: CLINIC | Age: 61
End: 2021-11-09
Payer: COMMERCIAL

## 2021-11-09 VITALS
WEIGHT: 168 LBS | RESPIRATION RATE: 16 BRPM | TEMPERATURE: 97.5 F | BODY MASS INDEX: 25.46 KG/M2 | DIASTOLIC BLOOD PRESSURE: 77 MMHG | OXYGEN SATURATION: 99 % | HEART RATE: 76 BPM | SYSTOLIC BLOOD PRESSURE: 134 MMHG | HEIGHT: 68 IN

## 2021-11-09 DIAGNOSIS — U07.1 COVID-19: ICD-10-CM

## 2021-11-09 DIAGNOSIS — G47.33 OBSTRUCTIVE SLEEP APNEA (ADULT) (PEDIATRIC): ICD-10-CM

## 2021-11-09 DIAGNOSIS — M34.9 SYSTEMIC SCLEROSIS, UNSPECIFIED: ICD-10-CM

## 2021-11-09 DIAGNOSIS — Z99.89 OBSTRUCTIVE SLEEP APNEA (ADULT) (PEDIATRIC): ICD-10-CM

## 2021-11-09 PROCEDURE — 36415 COLL VENOUS BLD VENIPUNCTURE: CPT

## 2021-11-09 PROCEDURE — 99214 OFFICE O/P EST MOD 30 MIN: CPT | Mod: 25

## 2021-11-09 NOTE — END OF VISIT
[] : Fellow [Time Spent: ___ minutes] : I have spent [unfilled] minutes of time on the encounter. [FreeTextEntry1] : at

## 2021-11-09 NOTE — HISTORY OF PRESENT ILLNESS
[Stable] : are stable [None] : ~He/She~ has no significant interval events [Difficulty Breathing During Exertion] : stable dyspnea on exertion [Feelings Of Weakness On Exertion] : stable exercise intolerance [Cough] : denies coughing [Wheezing] : denies wheezing [Regional Soft Tissue Swelling Both Lower Extremities] : denies lower extremity edema [Chest Pain Or Discomfort] : denies chest pain [Fever] : denies fever [0  -  Nothing at all] : 0, nothing at all [Date: ___] : was performed [unfilled] [TextBox_4] : -60 F with scleroderma ( diagnosed 2008, follows with Dr. Goldberg, not on any treatment), HTN, sickle cell trait here for initial evaluation. ---SHE USED TO F/U  WITH DR PATIÑO  BEFORE  BUT HAS NOT FOLLOWWED UP WITH HIM  FOR A WHILE-   Her scleroderma has been associated with skin manifestations including digital ulcers and Raynaud's, GERD , and now possible pulmonary manifestations. She takes nifedipine for raynaud's and vitamins. She reports pedal edema occasionally. denies orthopnea, PND, cough. -----H/O ANTI ENDOMYSIAL ANTIBODIES POSITIVE?CELAIC ---------------------------SEEING  A 'HOLISTIC IMMUNOLOGIST' \par She has LIMITED exercise capacity mainly due to joint pains and myalgias she reports.---------------\par CT chest Jan 2014 - Minimal subpleural ground glass attenuation in b/l lower lung fields, rt lung calcified granuloma, RML 4mm nodule and 2.6 cm thyroid nodule.-------------\par \par ct chest 12/2108 unchanged-\par \par PFTs---------APR 2018------ FVC 2.54, FEV1 1.85, FEV1/FVC 73, ----------mildly reduced DLCO, normal spirometry , increased RV. \par \par pft 3/2019 normal lung volumes, decreased  dlco\par \par Of note, pt has not followed with Dr. martino for scleroderma. She reports using "holistic medicines" to treat her scleroderma. \par PMD - Dr. Sin, \par \par ECHO   -----OCT 2017--------\par . Normal trileaflet aortic valve.\par 2. Increased relative wall thickness with normal left\par ventricular mass index, consistent with concentric left\par ventricular remodeling.\par 3. Normal left ventricular systolic function. No segmental\par wall motion abnormalities.\par 4. Normal right ventricular size and function.\par \par    CPAP 28049----ON NAGHAVI---HAD A CPAP STUDY AT HIS OFFICE AND WAS TOLD SHE SHOULD BE ON CPAP 12 CM OF H2O------\par \par echo 7/2020 normal study\par ct chest 10/2020 IMPRESSION:\par Bilateral lower lobe reticular and groundglass opacities with subpleural sparing are increased compared to prior studies. No traction bronchiectasis or honeycombing\par \par \par ct chest 9/2021 IMPRESSION: Ground glass/reticular opacities in the peripheral portions of both lower lobes have not significantly changed when compared to previous exams.\par -----9/2017---ENDOSCOPY --SCHATZKI'S  RING, , ESOPHAGEAL OBSTRUCTION.[ DR SUZANNE GUERIN] \par oct 2017 doing well-mild ORTIZ and mild dry cough\par declines influenza vac\par \par \par nov 2020   Scleroderma- follows rheum Dr Goldberg. -FEELS  THE SAME  -  Hypothyroid--  ON ARMOUR THYROID] Follows endo Dr Dionne De Paz \par JUNE 2021--FEELS BETTER ON CPAP 12 AND  NEBULIZER--------NEEDS CT CHEST---   ECHO DR PRATHER\par \par 10/2021 psg w/AHI 5\par \par 11/2021 reports covid virus  approx 10/10  "allergy symptoms" loss of smell and taste but overall symptoms were mild d/t fully vaccinated \par Now feels good , no resp symptoms--- results of the sleep study were discussed she has minimal sleep apnea---- [Oxygen] : the patient uses no supplemental oxygen [de-identified] : FVC 72, FEV1 71, FEV1/FVC 78, , DLCO 72

## 2021-11-09 NOTE — DISCUSSION/SUMMARY
[FreeTextEntry1] : -------ASSESSMENT AND PLAN---61   F with scleroderma, GERD , here for evaluation of shortness of breath for pulmonary manifestations of scleroderma also hypothyroidism-..\par 1 Sclerdoerma--follow up with Dr Goldberg\par 2- previous  CT chest unchanged- mild ILD- repeat yearly --\par 3- Hypothyroidism--on armour thyroid  f/u  endocrinology----DR. LAITH VILLASEÑOR----\par 4- recent HST did not show LYDIA\par 5- labs drawn today\par 6- declined influenza vac\par 7- s/p breakthrough covid - mild case - get CXR\par 8- f/u feb 2022 w/PFT \par \par ------Thanks for allowing  me to participate  in the care of this patient.  Patient at this time  will follow  the above mentioned recommendations and return back for follow up visit. If you have any questions  I can be reached  at [phone]  or  [phone].  \par \par Malu Villaseñor MD, FCCP \par Director, Pulmonary Hypertension Program \par [facility] \par Division of Pulmonary, Critical Care and Sleep Medicine \par  Professor of Medicine \par [employer]\par \par \par AVA Newby-C\par

## 2021-11-09 NOTE — LETTER CLOSING
[Sincerely,] : Sincerely, [Malu De Paz MD, FCCP] : Malu De Paz MD, FCCP [Director, Pulmonary Hypertension Program] : Director, Pulmonary Hypertension Program [[facility]] : [facility] [Division of Pulmonary, Critical Care and Sleep Medicine] : Division of Pulmonary, Critical Care and Sleep Medicine [Associate Professor of Medicine] : Associate Professor of Medicine [[facility]] : [facility]

## 2021-11-10 LAB
ALBUMIN SERPL ELPH-MCNC: 4.2 G/DL
ALP BLD-CCNC: 68 U/L
ALT SERPL-CCNC: 17 U/L
ANION GAP SERPL CALC-SCNC: 18 MMOL/L
AST SERPL-CCNC: 21 U/L
BASOPHILS # BLD AUTO: 0.03 K/UL
BASOPHILS NFR BLD AUTO: 0.5 %
BILIRUB SERPL-MCNC: 0.4 MG/DL
BUN SERPL-MCNC: 17 MG/DL
CALCIUM SERPL-MCNC: 9.3 MG/DL
CHLORIDE SERPL-SCNC: 106 MMOL/L
CO2 SERPL-SCNC: 21 MMOL/L
COVID-19 NUCLEOCAPSID  GAM ANTIBODY INTERPRETATION: POSITIVE
COVID-19 SPIKE DOMAIN ANTIBODY INTERPRETATION: POSITIVE
CREAT SERPL-MCNC: 0.67 MG/DL
CRP SERPL-MCNC: <3 MG/L
DEPRECATED D DIMER PPP IA-ACNC: 153 NG/ML DDU
EOSINOPHIL # BLD AUTO: 0.07 K/UL
EOSINOPHIL NFR BLD AUTO: 1.3 %
FERRITIN SERPL-MCNC: 52 NG/ML
GLUCOSE SERPL-MCNC: 77 MG/DL
HCT VFR BLD CALC: 35.3 %
HGB BLD-MCNC: 11.3 G/DL
IMM GRANULOCYTES NFR BLD AUTO: 0.2 %
LYMPHOCYTES # BLD AUTO: 2.14 K/UL
LYMPHOCYTES NFR BLD AUTO: 38.6 %
MAN DIFF?: NORMAL
MCHC RBC-ENTMCNC: 25.8 PG
MCHC RBC-ENTMCNC: 32 GM/DL
MCV RBC AUTO: 80.6 FL
MONOCYTES # BLD AUTO: 0.38 K/UL
MONOCYTES NFR BLD AUTO: 6.9 %
NEUTROPHILS # BLD AUTO: 2.91 K/UL
NEUTROPHILS NFR BLD AUTO: 52.5 %
PLATELET # BLD AUTO: 204 K/UL
POTASSIUM SERPL-SCNC: 4.1 MMOL/L
PROCALCITONIN SERPL-MCNC: 0.03 NG/ML
PROT SERPL-MCNC: 6.6 G/DL
RBC # BLD: 4.38 M/UL
RBC # FLD: 15.1 %
SARS-COV-2 AB SERPL IA-ACNC: >250 U/ML
SARS-COV-2 AB SERPL QL IA: 1.73 INDEX
SODIUM SERPL-SCNC: 144 MMOL/L
WBC # FLD AUTO: 5.54 K/UL

## 2022-02-05 ENCOUNTER — APPOINTMENT (OUTPATIENT)
Dept: DISASTER EMERGENCY | Facility: CLINIC | Age: 62
End: 2022-02-05

## 2022-02-07 NOTE — LETTER CLOSING
[Sincerely,] : Sincerely, [Malu De Paz MD, FCCP] : Malu De aPz MD, FCCP [Director, Pulmonary Hypertension Program] : Director, Pulmonary Hypertension Program [[facility]] : [facility] [Division of Pulmonary, Critical Care and Sleep Medicine] : Division of Pulmonary, Critical Care and Sleep Medicine [Associate Professor of Medicine] : Associate Professor of Medicine [[facility]] : [facility]

## 2022-02-08 ENCOUNTER — APPOINTMENT (OUTPATIENT)
Dept: PULMONOLOGY | Facility: CLINIC | Age: 62
End: 2022-02-08
Payer: COMMERCIAL

## 2022-02-08 VITALS
HEIGHT: 68 IN | TEMPERATURE: 97.3 F | HEART RATE: 86 BPM | WEIGHT: 166 LBS | BODY MASS INDEX: 25.16 KG/M2 | RESPIRATION RATE: 15 BRPM | SYSTOLIC BLOOD PRESSURE: 111 MMHG | DIASTOLIC BLOOD PRESSURE: 76 MMHG

## 2022-02-08 PROCEDURE — 94729 DIFFUSING CAPACITY: CPT

## 2022-02-08 PROCEDURE — 94618 PULMONARY STRESS TESTING: CPT

## 2022-02-08 PROCEDURE — 94726 PLETHYSMOGRAPHY LUNG VOLUMES: CPT

## 2022-02-08 PROCEDURE — ZZZZZ: CPT

## 2022-02-08 PROCEDURE — 99215 OFFICE O/P EST HI 40 MIN: CPT | Mod: 25

## 2022-02-08 PROCEDURE — 94010 BREATHING CAPACITY TEST: CPT

## 2022-02-08 RX ORDER — AZITHROMYCIN 250 MG/1
250 TABLET, FILM COATED ORAL
Qty: 1 | Refills: 0 | Status: DISCONTINUED | COMMUNITY
Start: 2020-11-10 | End: 2022-02-08

## 2022-02-08 NOTE — END OF VISIT
[Time Spent: ___ minutes] : I have spent [unfilled] minutes of time on the encounter. [] : Fellow [FreeTextEntry1] : at

## 2022-02-08 NOTE — DISCUSSION/SUMMARY
[FreeTextEntry1] : -------ASSESSMENT AND PLAN---61   F with scleroderma, GERD , here for evaluation of shortness of breath for pulmonary manifestations of scleroderma also hypothyroidism-..\par 1 Sclerdoerma--follow up with Dr Goldberg\par 2- previous  CT chest unchanged- mild ILD- repeat yearly --\par 3- Hypothyroidism--on armour thyroid  f/u  endocrinology----DR. LAITH VILLASEÑOR----\par 4- psg rdi 5.4  \par 5- home exercsis\par 6- declined influenza vac\par 7- echo later in 2022\par 8- f/u june 2022  \par \par ------Thanks for allowing  me to participate  in the care of this patient.  Patient at this time  will follow  the above mentioned recommendations and return back for follow up visit. If you have any questions  I can be reached  at [phone]  or  [phone].  \par \par Malu Villaseñor MD, FCCP \par Director, Pulmonary Hypertension Program \par [facility] \par Division of Pulmonary, Critical Care and Sleep Medicine \par  Professor of Medicine \par [employer]\par \par \par JAIME Newby\par

## 2022-02-08 NOTE — HISTORY OF PRESENT ILLNESS
[Stable] : are stable [None] : ~He/She~ has no significant interval events [Difficulty Breathing During Exertion] : stable dyspnea on exertion [Feelings Of Weakness On Exertion] : stable exercise intolerance [Cough] : denies coughing [Wheezing] : denies wheezing [Regional Soft Tissue Swelling Both Lower Extremities] : denies lower extremity edema [Chest Pain Or Discomfort] : denies chest pain [Fever] : denies fever [0  -  Nothing at all] : 0, nothing at all [Date: ___] : was performed [unfilled] [TextBox_4] : -61 F with scleroderma ( diagnosed 2008, follows with Dr. Goldberg, not on any treatment), HTN, sickle cell trait here for initial evaluation. ---SHE USED TO F/U  WITH DR PATIÑO  BEFORE  BUT HAS NOT FOLLOWWED UP WITH HIM  FOR A WHILE-   Her scleroderma has been associated with skin manifestations including digital ulcers and Raynaud's, GERD , and now possible pulmonary manifestations. She takes nifedipine for raynaud's and vitamins. She reports pedal edema occasionally. denies orthopnea, PND, cough. -----H/O ANTI ENDOMYSIAL ANTIBODIES POSITIVE?CELAIC ---------------------------SEEING  A 'HOLISTIC IMMUNOLOGIST' \par She has LIMITED exercise capacity mainly due to joint pains and myalgias she reports.---------------\par CT chest Jan 2014 - Minimal subpleural ground glass attenuation in b/l lower lung fields, rt lung calcified granuloma, RML 4mm nodule and 2.6 cm thyroid nodule.-------------\par \par ct chest 12/2108 unchanged-\par \par PFTs---------APR 2018------ FVC 2.54, FEV1 1.85, FEV1/FVC 73, ----------mildly reduced DLCO, normal spirometry , increased RV. \par PFT 2022----------SPIROMETRY NORMAL, LUNG VOLUMES NORMAL -DECREASE IN DLCO\par \par pft 3/2019 normal lung volumes, decreased  dlco\par \par \par PSG 2021---  RDI 5.3 \par Of note, pt has not followed with Dr. martino for scleroderma. She reports using "holistic medicines" to treat her scleroderma. \par PMD - Dr. Sin, \par \par ECHO   -----OCT 2017--------\par . Normal trileaflet aortic valve.\par 2. Increased relative wall thickness with normal left\par ventricular mass index, consistent with concentric left\par ventricular remodeling.\par 3. Normal left ventricular systolic function. No segmental\par wall motion abnormalities.\par 4. Normal right ventricular size and function.\par \par    CPAP 19115----[place] NAGHAVI---HAD A CPAP STUDY AT HIS OFFICE AND WAS TOLD SHE SHOULD BE ON CPAP 12 CM OF H2O------\par \par echo 7/2020 normal study\par ct chest 10/2020 IMPRESSION:\par Bilateral lower lobe reticular and groundglass opacities with subpleural sparing are increased compared to prior studies. No traction bronchiectasis or honeycombing\par \par \par ct chest 9/2021 IMPRESSION: Ground glass/reticular opacities in the peripheral portions of both lower lobes have not significantly changed when compared to previous exams.\par -----9/2017---ENDOSCOPY --SCHATZKI'S  RING, , ESOPHAGEAL OBSTRUCTION.[ DR SUZANNE GUERIN] \par oct 2017 doing well-mild ORTIZ and mild dry cough\par declines influenza vac\par \par \par nov 2020   Scleroderma- follows rheum Dr Goldberg. -FEELS  THE SAME  -  Hypothyroid--  ON ARMOUR THYROID] Follows endo Dr Dionne De Paz \par JUNE 2021--FEELS BETTER ON CPAP 12 AND  NEBULIZER--------NEEDS CT CHEST---   ECHO DR PRATHER\par \par 10/2021 psg w/AHI 5\par \par 11/2021 reports covid virus  approx 10/10  "allergy symptoms" loss of smell and taste but overall symptoms were mild d/t fully vaccinated \par Now feels good , no resp symptoms--- results of the sleep study were discussed she has minimal sleep apnea----\par \par feb 2022--------feels  better----- pft  stable---  6 mwt  425 meters--repeat echo laer this year---fu with dr goldberg for scleroderma--on armour thyroid [Oxygen] : the patient uses no supplemental oxygen [de-identified] : FVC 72, FEV1 71, FEV1/FVC 78, , DLCO 72

## 2022-02-08 NOTE — REVIEW OF SYSTEMS
ED Provider Note  [facility]      History     Chief Complaint   Patient presents with     Chest Pain     Left AMA from [facility] because wait was too long. Called 911. Here  for evaluation.     HPI  Ravindra Oro is a 28 year old male with a history of depressive disorder psychosis and schizoaffective disorder who presents with 3 of left chest and epigastric abdominal pain. States the pain started when he got off of work at 230. Pain is associated with shortness of breath. He is not able to differentiate the quality of the pain. No fever, chills, headache, sweating, or referred pain.He is a poor historian and it is unclear if he understands the questions. He denies alcohol, tobacco or drug use.    Past Medical History  Past Medical History:   Diagnosis Date     Depressive disorder      Psychosis (H)      Schizoaffective disorder (H)      No past surgical history on file.  acetaminophen (TYLENOL) 325 MG tablet  acetaminophen (TYLENOL) 500 MG tablet  cholecalciferol (VITAMIN D) 1000 UNIT tablet  cyanocobalamin (VITAMIN B-12) 1000 MCG tablet  Fluocinolone Acetonide (DERMA-SMOOTHE/FS SCALP) 0.01 % OIL  FLUoxetine (PROZAC) 10 MG capsule  FLUoxetine (PROZAC) 20 MG capsule  fluticasone (FLONASE) 50 MCG/ACT nasal spray  ibuprofen (ADVIL/MOTRIN) 800 MG tablet  ketoconazole (NIZORAL) 2 % shampoo  mirtazapine (REMERON) 30 MG tablet  OLANZapine (ZYPREXA) 5 MG tablet  OLANZapine zydis (ZYPREXA) 5 MG ODT  triamcinolone (KENALOG) 0.025 % external ointment      No Known Allergies  Family History  Family History   Problem Relation Age of Onset     Cancer No family hx of         No family history of skin cancer     Melanoma No family hx of      Skin Cancer No family hx of      Social History   Social History     Tobacco Use     Smoking status: Never Smoker     Smokeless tobacco: Never Used   Substance Use Topics     Alcohol use: Never     Drug use: Not Currently     Types: Marijuana      Past medical  "history, past surgical history, medications, allergies, family history, and social history were reviewed with the patient. No additional pertinent items.       Review of Systems   Constitutional: Negative for chills, fatigue and fever.   HENT: Negative for congestion and sore throat.    Eyes: Negative for pain and visual disturbance.   Respiratory: Positive for shortness of breath. Negative for cough and chest tightness.    Cardiovascular: Positive for chest pain. Negative for palpitations.   Gastrointestinal: Negative for abdominal distention, abdominal pain, constipation, diarrhea, nausea and vomiting.   Genitourinary: Negative for difficulty urinating, dysuria, frequency and urgency.   Musculoskeletal: Negative for arthralgias, back pain, myalgias and neck pain.   Skin: Negative for color change and rash.   Neurological: Negative for dizziness, weakness and headaches.   Psychiatric/Behavioral: Negative for confusion.     A complete review of systems was performed with pertinent positives and negatives noted in the HPI, and all other systems negative.    Physical Exam   BP: (!) 128/97  Pulse: 61  Temp: 97.9  F (36.6  C)  Resp: 16  Height: 157.5 cm (5' 2\")  Weight: 68.9 kg (152 lb)  SpO2: 98 %  Physical Exam  Vitals and nursing note reviewed.   Constitutional:       General: He is not in acute distress.     Appearance: Normal appearance. He is not ill-appearing or toxic-appearing.   HENT:      Head: Normocephalic and atraumatic.      Nose: Nose normal.      Mouth/Throat:      Mouth: Mucous membranes are moist.   Eyes:      Pupils: Pupils are equal, round, and reactive to light.   Cardiovascular:      Rate and Rhythm: Normal rate.      Pulses: Normal pulses.      Heart sounds: Normal heart sounds.   Pulmonary:      Effort: Pulmonary effort is normal. No respiratory distress.      Breath sounds: Normal breath sounds.   Abdominal:      General: Abdomen is flat. There is no distension.   Musculoskeletal:         " General: No swelling or deformity. Normal range of motion.      Cervical back: Normal range of motion. No rigidity.   Skin:     General: Skin is warm.      Capillary Refill: Capillary refill takes less than 2 seconds.   Neurological:      Mental Status: He is alert and oriented to person, place, and time.   Psychiatric:         Mood and Affect: Mood normal.         ED Course      Procedures       The medical record was reviewed and interpreted.  Current labs reviewed and interpreted.              Results for orders placed or performed during the hospital encounter of 01/19/22   Comprehensive metabolic panel     Status: Abnormal   Result Value Ref Range    Sodium 138 133 - 144 mmol/L    Potassium 3.6 3.4 - 5.3 mmol/L    Chloride 104 94 - 109 mmol/L    Carbon Dioxide (CO2) 30 20 - 32 mmol/L    Anion Gap 4 3 - 14 mmol/L    Urea Nitrogen 17 7 - 30 mg/dL    Creatinine 0.78 0.66 - 1.25 mg/dL    Calcium 8.9 8.5 - 10.1 mg/dL    Glucose 103 (H) 70 - 99 mg/dL    Alkaline Phosphatase 40 40 - 150 U/L    AST 15 0 - 45 U/L    ALT 23 0 - 70 U/L    Protein Total 7.1 6.8 - 8.8 g/dL    Albumin 3.9 3.4 - 5.0 g/dL    Bilirubin Total 0.5 0.2 - 1.3 mg/dL    GFR Estimate >90 >60 mL/min/1.73m2   Lipase     Status: Normal   Result Value Ref Range    Lipase 103 73 - 393 U/L   CBC with platelets and differential     Status: None   Result Value Ref Range    WBC Count 4.2 4.0 - 11.0 10e3/uL    RBC Count 5.22 4.40 - 5.90 10e6/uL    Hemoglobin 15.0 13.3 - 17.7 g/dL    Hematocrit 45.6 40.0 - 53.0 %    MCV 87 78 - 100 fL    MCH 28.7 26.5 - 33.0 pg    MCHC 32.9 31.5 - 36.5 g/dL    RDW 13.1 10.0 - 15.0 %    Platelet Count 235 150 - 450 10e3/uL    % Neutrophils 47 %    % Lymphocytes 38 %    % Monocytes 10 %    % Eosinophils 4 %    % Basophils 1 %    % Immature Granulocytes 0 %    NRBCs per 100 WBC 0 <1 /100    Absolute Neutrophils 2.0 1.6 - 8.3 10e3/uL    Absolute Lymphocytes 1.6 0.8 - 5.3 10e3/uL    Absolute Monocytes 0.4 0.0 - 1.3 10e3/uL     Absolute Eosinophils 0.2 0.0 - 0.7 10e3/uL    Absolute Basophils 0.0 0.0 - 0.2 10e3/uL    Absolute Immature Granulocytes 0.0 <=0.4 10e3/uL    Absolute NRBCs 0.0 10e3/uL   Extra Blue Top Tube     Status: None   Result Value Ref Range    Hold Specimen JIC    Troponin I     Status: Normal   Result Value Ref Range    Troponin I High Sensitivity <3 <79 ng/L   CBC with platelets differential     Status: None    Narrative    The following orders were created for panel order CBC with platelets differential.  Procedure                               Abnormality         Status                     ---------                               -----------         ------                     CBC with platelets and d...[[id number]]                      Final result                 Please view results for these tests on the individual orders.   Extra Tube     Status: None    Narrative    The following orders were created for panel order Extra Tube.  Procedure                               Abnormality         Status                     ---------                               -----------         ------                     Extra Blue Top Tube[[id number]]                              Final result                 Please view results for these tests on the individual orders.     Medications - No data to display     Assessments & Plan (with Medical Decision Making)   Patient resents the ED for evaluation of chest pain.  Differential includes ACS, PE, aortic dissection, pneumonia, costochondritis.    On arrival, patient has normal vital signs.  He overall appears well.  No chest wall tenderness.  Lungs are clear.  Normal heart sounds    EKG shows early repolarization.  No obvious ischemia.    Plan for cardiac work-up including CBC, BMP, troponin, EKG, chest x-ray    Prior to resolution of work-up, patient eloped from the emergency department.    I have reviewed the nursing notes. I have reviewed the findings, diagnosis, plan and need for follow up  with the patient.    Discharge Medication List as of 1/19/2022  6:28 AM          Final diagnoses:   Chest pain, unspecified type       --  Jordi Vences DO  [facility] EMERGENCY DEPARTMENT  1/19/2022     Jordi Vences DO  01/19/22 0729     [Dry Mouth] : dry mouth [As Noted in HPI] : as noted in HPI [Heartburn] : heartburn [Reflux] : reflux [Myalgias] : myalgias [Arthralgias] : arthralgias [Raynaud] : Raynaud's phenomenon was observed [Scleroderma] : scleroderma [Negative] : Sleep Disorder [Postnasal Drip] : no postnasal drip [Sinus Problems] : no sinus problems [Mouth Ulcers] : no mouth ulcers [Cough] : no cough [PND] : no PND [Orthopnea] : no orthopnea [Edema] : ~T edema was not present

## 2022-03-30 ENCOUNTER — APPOINTMENT (OUTPATIENT)
Dept: CARDIOLOGY | Facility: CLINIC | Age: 62
End: 2022-03-30
Payer: COMMERCIAL

## 2022-03-30 PROCEDURE — 93306 TTE W/DOPPLER COMPLETE: CPT

## 2022-05-01 ENCOUNTER — APPOINTMENT (OUTPATIENT)
Dept: ORTHOPEDIC SURGERY | Facility: CLINIC | Age: 62
End: 2022-05-01
Payer: COMMERCIAL

## 2022-05-01 VITALS — HEIGHT: 68 IN | WEIGHT: 170 LBS | BODY MASS INDEX: 25.76 KG/M2

## 2022-05-01 DIAGNOSIS — M50.90 CERVICAL DISC DISORDER, UNSPECIFIED, UNSPECIFIED CERVICAL REGION: ICD-10-CM

## 2022-05-01 DIAGNOSIS — M54.12 RADICULOPATHY, CERVICAL REGION: ICD-10-CM

## 2022-05-01 DIAGNOSIS — M54.2 CERVICALGIA: ICD-10-CM

## 2022-05-01 DIAGNOSIS — M62.838 OTHER MUSCLE SPASM: ICD-10-CM

## 2022-05-01 PROBLEM — Z00.00 ENCOUNTER FOR PREVENTIVE HEALTH EXAMINATION: Status: ACTIVE | Noted: 2022-05-01

## 2022-05-01 PROCEDURE — 72040 X-RAY EXAM NECK SPINE 2-3 VW: CPT

## 2022-05-01 PROCEDURE — 99204 OFFICE O/P NEW MOD 45 MIN: CPT

## 2022-05-01 RX ORDER — CYCLOBENZAPRINE HYDROCHLORIDE 5 MG/1
5 TABLET, FILM COATED ORAL
Qty: 30 | Refills: 0 | Status: ACTIVE | COMMUNITY
Start: 2022-05-01 | End: 1900-01-01

## 2022-05-01 RX ORDER — METHYLPREDNISOLONE 4 MG/1
4 TABLET ORAL
Qty: 21 | Refills: 0 | Status: COMPLETED | COMMUNITY
Start: 2022-05-01 | End: 2022-05-31

## 2022-05-25 ENCOUNTER — APPOINTMENT (OUTPATIENT)
Dept: ORTHOPEDIC SURGERY | Facility: CLINIC | Age: 62
End: 2022-05-25

## 2022-05-30 ENCOUNTER — APPOINTMENT (OUTPATIENT)
Dept: ORTHOPEDIC SURGERY | Facility: CLINIC | Age: 62
End: 2022-05-30

## 2022-05-30 ENCOUNTER — APPOINTMENT (OUTPATIENT)
Dept: ORTHOPEDIC SURGERY | Facility: CLINIC | Age: 62
End: 2022-05-30
Payer: COMMERCIAL

## 2022-05-30 VITALS — WEIGHT: 166 LBS | BODY MASS INDEX: 25.16 KG/M2 | HEIGHT: 68 IN

## 2022-05-30 DIAGNOSIS — G89.29 LOW BACK PAIN, UNSPECIFIED: ICD-10-CM

## 2022-05-30 DIAGNOSIS — M54.50 LOW BACK PAIN, UNSPECIFIED: ICD-10-CM

## 2022-05-30 DIAGNOSIS — M54.2 CERVICALGIA: ICD-10-CM

## 2022-05-30 PROCEDURE — 72070 X-RAY EXAM THORAC SPINE 2VWS: CPT

## 2022-05-30 PROCEDURE — 72170 X-RAY EXAM OF PELVIS: CPT

## 2022-05-30 PROCEDURE — 99214 OFFICE O/P EST MOD 30 MIN: CPT

## 2022-05-30 PROCEDURE — 72100 X-RAY EXAM L-S SPINE 2/3 VWS: CPT

## 2022-05-30 NOTE — HISTORY OF PRESENT ILLNESS
[Neck] : neck [2] : 2 [Dull/Aching] : dull/aching [Localized] : localized [Intermittent] : intermittent [Full time] : Work status: full time [de-identified] : 9/23/19\par \par 10/22/21: Here for fu for the lower back pain - at times shooting down the left leg- overall doing a bit better with the\par lower back\par No recent PT\par had covid\par Naprosyn at times\par \par 5/30/22: here for fu - plan at last was "pain continues in the back - rec nsaids (has Naprosyn)\par PT" - overall had a flare up of back pain\par lower back pain and down the left leg - stiff around the left hip \par \par she had a flare up and was seen in the UC and given oral steroids and had a neck xrays today\par \par Was sent to PT - and she went to PT \par she is trying to avoid nsaids as much as possible \par \par scleroderma is stable no change in medications \par \par xrays today:\par T spine - negative\par L spine - grade 1 DS at L3-4 and L4-5\par AP PELVIS - no obvious fracture \par \par  [] : Post Surgical Visit: no [FreeTextEntry5] : here for follow up for neck\par pain has gotten better, still complains of aching pain

## 2022-05-30 NOTE — REASON FOR VISIT
[FreeTextEntry2] : p\par Patient Complaint - NF DOI 9/23/19\par Neck and upper back\par History of Present Illness\par 10/22/21: Here for fu for the lower back pain - at times shooting down the left leg- overall doing a bit better with the\par lower back\par No recent PT\par had covid\par \par 5/30/22: Doing better with PT.  Taking naproxen much less often.  She is no longer in excruciating pain

## 2022-06-13 ENCOUNTER — APPOINTMENT (OUTPATIENT)
Dept: PULMONOLOGY | Facility: CLINIC | Age: 62
End: 2022-06-13
Payer: COMMERCIAL

## 2022-06-13 ENCOUNTER — NON-APPOINTMENT (OUTPATIENT)
Age: 62
End: 2022-06-13

## 2022-06-13 VITALS
WEIGHT: 171 LBS | SYSTOLIC BLOOD PRESSURE: 144 MMHG | BODY MASS INDEX: 25.91 KG/M2 | HEIGHT: 68 IN | DIASTOLIC BLOOD PRESSURE: 80 MMHG

## 2022-06-13 DIAGNOSIS — R06.00 DYSPNEA, UNSPECIFIED: ICD-10-CM

## 2022-06-13 PROCEDURE — 99214 OFFICE O/P EST MOD 30 MIN: CPT | Mod: 25

## 2022-06-13 PROCEDURE — 36415 COLL VENOUS BLD VENIPUNCTURE: CPT

## 2022-06-13 RX ORDER — AZITHROMYCIN 250 MG/1
250 TABLET, FILM COATED ORAL
Qty: 1 | Refills: 0 | Status: ACTIVE | COMMUNITY
Start: 2022-06-13 | End: 1900-01-01

## 2022-06-13 NOTE — PHYSICAL EXAM
[General Appearance - Well Developed] : well developed [Normal Appearance] : normal appearance [Well Groomed] : well groomed [General Appearance - Well Nourished] : well nourished [No Deformities] : no deformities [General Appearance - In No Acute Distress] : no acute distress [Normal Conjunctiva] : the conjunctiva exhibited no abnormalities [II] : II [Jugular Venous Distention Increased] : there was no jugular-venous distention [Heart Sounds] : normal S1 and S2 [Murmurs] : no murmurs present [Respiration, Rhythm And Depth] : normal respiratory rhythm and effort [Exaggerated Use Of Accessory Muscles For Inspiration] : no accessory muscle use [Lesions: ____] : lesions [unfilled] [Abdomen Soft] : soft [Abdomen Tenderness] : non-tender [Abnormal Walk] : normal gait [Gait - Sufficient For Exercise Testing] : the gait was sufficient for exercise testing [Skin Color & Pigmentation] : normal skin color and pigmentation [No Venous Stasis] : no venous stasis [] : no rash [Skin Lesions] : no skin lesions [No Skin Ulcers] : no skin ulcer [No Xanthoma] : no  xanthoma was observed [Deep Tendon Reflexes (DTR)] : deep tendon reflexes were 2+ and symmetric [No Focal Deficits] : no focal deficits [Sensation] : the sensory exam was normal to light touch and pinprick [Impaired Insight] : insight and judgment were intact [Oriented To Time, Place, And Person] : oriented to person, place, and time [Nail Clubbing] : no clubbing of the fingernails [Affect] : the affect was normal [Mood] : the mood was normal [FreeTextEntry1] : narrowed mouth opening, skin tightening

## 2022-06-13 NOTE — HISTORY OF PRESENT ILLNESS
[Stable] : are stable [None] : ~He/She~ has no significant interval events [Difficulty Breathing During Exertion] : stable dyspnea on exertion [Feelings Of Weakness On Exertion] : stable exercise intolerance [Cough] : denies coughing [Wheezing] : denies wheezing [Regional Soft Tissue Swelling Both Lower Extremities] : denies lower extremity edema [Chest Pain Or Discomfort] : denies chest pain [Fever] : denies fever [0  -  Nothing at all] : 0, nothing at all [Date: ___] : was performed [unfilled] [TextBox_4] : -61 F with scleroderma ( diagnosed 2008, follows with Dr. Goldberg, not on any treatment), HTN, sickle cell trait here for initial evaluation. ---SHE USED TO F/U  WITH DR PATIÑO  BEFORE  BUT HAS NOT FOLLOWWED UP WITH HIM  FOR A WHILE-   Her scleroderma has been associated with skin manifestations including digital ulcers and Raynaud's, GERD , and now possible pulmonary manifestations. She takes nifedipine for raynaud's and vitamins. She reports pedal edema occasionally. denies orthopnea, PND, cough. -----H/O ANTI ENDOMYSIAL ANTIBODIES POSITIVE?CELAIC ---------------------------SEEING  A 'HOLISTIC IMMUNOLOGIST' \par She has LIMITED exercise capacity mainly due to joint pains and myalgias she reports.---------------\par CT chest Jan 2014 - Minimal subpleural ground glass attenuation in b/l lower lung fields, rt lung calcified granuloma, RML 4mm nodule and 2.6 cm thyroid nodule.-------------\par \par ct chest 12/2108 unchanged-\par \par PFTs---------APR 2018------ FVC 2.54, FEV1 1.85, FEV1/FVC 73, ----------mildly reduced DLCO, normal spirometry , increased RV. \par PFT 2022----------SPIROMETRY NORMAL, LUNG VOLUMES NORMAL -DECREASE IN DLCO\par \par pft 3/2019 normal lung volumes, decreased  dlco\par \par \par PSG 2021---  RDI 5.3 \par Of note, pt has not followed with Dr. martino for scleroderma. She reports using "holistic medicines" to treat her scleroderma. \par PMD - Dr. Sin, \par \par ECHO   -----OCT 2017--------\par . Normal trileaflet aortic valve.\par 2. Increased relative wall thickness with normal left\par ventricular mass index, consistent with concentric left\par ventricular remodeling.\par 3. Normal left ventricular systolic function. No segmental\par wall motion abnormalities.\par 4. Normal right ventricular size and function.\par \par    CPAP 53824----RU NAGHAVI---HAD A CPAP STUDY AT HIS OFFICE AND WAS TOLD SHE SHOULD BE ON CPAP 12 CM OF H2O------\par \par echo 7/2020 normal study\par ct chest 10/2020 IMPRESSION:\par Bilateral lower lobe reticular and groundglass opacities with subpleural sparing are increased compared to prior studies. No traction bronchiectasis or honeycombing\par \par \par ct chest 9/2021 IMPRESSION: Ground glass/reticular opacities in the peripheral portions of both lower lobes have not significantly changed when compared to previous exams.\par -----9/2017---ENDOSCOPY --SCHATZKI'S  RING, , ESOPHAGEAL OBSTRUCTION.[ DR SUZANNE GUERIN] \par oct 2017 doing well-mild ORTIZ and mild dry cough\par declines influenza vac\par \par \par nov 2020   Scleroderma- follows rheum Dr Goldberg. -FEELS  THE SAME  -  Hypothyroid--  ON ARMOUR THYROID] Follows endo Dr Dionne De Paz \par JUNE 2021--FEELS BETTER ON CPAP 12 AND  NEBULIZER--------NEEDS CT CHEST---   ECHO DR PRATHER\par \par 10/2021 psg w/AHI 5\par \par 11/2021 reports covid virus  approx 10/10  "allergy symptoms" loss of smell and taste but overall symptoms were mild d/t fully vaccinated \par Now feels good , no resp symptoms--- results of the sleep study were discussed she has minimal sleep apnea----\par \par feb 2022--------feels  better----- pft  stable---  6 mwt -2022 ---   425 meters--repeat echo laer this year---fu with dr goldberg for scleroderma--on armour thyroid\par june 2022---    -fu with dr goldberg for scleroderma--on armour thyroid  -----complaining of sore throat we will do a nasal swab prednisone Z-Messi ------ she will call us in a week to let us know how she is doing ------dfollow-up in 3 months [Oxygen] : the patient uses no supplemental oxygen [de-identified] : FVC 72, FEV1 71, FEV1/FVC 78, , DLCO 72

## 2022-06-13 NOTE — DISCUSSION/SUMMARY
[FreeTextEntry1] : -------ASSESSMENT AND PLAN---61   F with scleroderma, GERD , here for evaluation of shortness of breath for pulmonary manifestations of scleroderma also hypothyroidism-..\par 1 Sclerdoerma--follow up with Dr Goldberg\par 2- previous  CT chest unchanged- mild ILD- repeat yearly --\par 3- Hypothyroidism--on armour thyroid  f/u  endocrinology----DR. LAITH VILLASEÑOR----\par 4- psg rdi 5.4  \par 5- home exercsis\par 6- declined influenza vac\par 7- echo later in 2022\par 8-   9---   complaining of sore throat we will do a nasal swab prednisone Z-Messi ------ she will call us in a week to let us know how she is doing ------dfollow-up in 3 months\par \par f/u june 2022  \par \par ------Thanks for allowing  me to participate  in the care of this patient.  Patient at this time  will follow  the above mentioned recommendations and return back for follow up visit. If you have any questions  I can be reached  at [phone]  or  [phone].  \par \par Malu Villaseñor MD, FCCP \par Director, Pulmonary Hypertension Program \par [facility] \par Division of Pulmonary, Critical Care and Sleep Medicine \par  Professor of Medicine \par [employer]\par \par \par JAIME Newby\par

## 2022-06-13 NOTE — LETTER CLOSING
[Sincerely,] : Sincerely, [Malu De Paz MD, FCCP] : Malu De Paz MD, FCCP [Director, Pulmonary Hypertension Program] : Director, Pulmonary Hypertension Program [[facility]] : [facility] [Division of Pulmonary, Critical Care and Sleep Medicine] : Division of Pulmonary, Critical Care and Sleep Medicine [Associate Professor of Medicine] : Associate Professor of Medicine [[facility]] : [facility]

## 2022-06-14 LAB
ALBUMIN SERPL ELPH-MCNC: 4.5 G/DL
ALP BLD-CCNC: 78 U/L
ALT SERPL-CCNC: 15 U/L
ANION GAP SERPL CALC-SCNC: 13 MMOL/L
AST SERPL-CCNC: 20 U/L
BASOPHILS # BLD AUTO: 0.04 K/UL
BASOPHILS NFR BLD AUTO: 0.7 %
BILIRUB SERPL-MCNC: 0.3 MG/DL
BUN SERPL-MCNC: 20 MG/DL
CALCIUM SERPL-MCNC: 9.5 MG/DL
CHLORIDE SERPL-SCNC: 105 MMOL/L
CO2 SERPL-SCNC: 25 MMOL/L
CREAT SERPL-MCNC: 0.71 MG/DL
EGFR: 97 ML/MIN/1.73M2
EOSINOPHIL # BLD AUTO: 0.12 K/UL
EOSINOPHIL NFR BLD AUTO: 2.1 %
GLUCOSE SERPL-MCNC: 81 MG/DL
HCT VFR BLD CALC: 37.6 %
HGB BLD-MCNC: 12.2 G/DL
IMM GRANULOCYTES NFR BLD AUTO: 0.2 %
LYMPHOCYTES # BLD AUTO: 2.34 K/UL
LYMPHOCYTES NFR BLD AUTO: 40 %
MAN DIFF?: NORMAL
MCHC RBC-ENTMCNC: 26.1 PG
MCHC RBC-ENTMCNC: 32.4 GM/DL
MCV RBC AUTO: 80.5 FL
MONOCYTES # BLD AUTO: 0.47 K/UL
MONOCYTES NFR BLD AUTO: 8 %
NEUTROPHILS # BLD AUTO: 2.87 K/UL
NEUTROPHILS NFR BLD AUTO: 49 %
PLATELET # BLD AUTO: 214 K/UL
POTASSIUM SERPL-SCNC: 4 MMOL/L
PROT SERPL-MCNC: 6.8 G/DL
RBC # BLD: 4.67 M/UL
RBC # FLD: 14.8 %
SODIUM SERPL-SCNC: 142 MMOL/L
WBC # FLD AUTO: 5.85 K/UL

## 2022-06-15 LAB — SARS-COV-2 N GENE NPH QL NAA+PROBE: NOT DETECTED

## 2022-06-16 LAB — IGE SER-MCNC: 24 KU/L

## 2022-08-26 ENCOUNTER — APPOINTMENT (OUTPATIENT)
Dept: ORTHOPEDIC SURGERY | Facility: CLINIC | Age: 62
End: 2022-08-26

## 2022-12-12 NOTE — LETTER CLOSING
[Sincerely,] : Sincerely, [Malu De Paz MD, FCCP] : Malu De Paz MD, FCCP [Director, Pulmonary Hypertension Program] : Director, Pulmonary Hypertension Program [[facility]] : [facility] [Division of Pulmonary, Critical Care and Sleep Medicine] : Division of Pulmonary, Critical Care and Sleep Medicine [Associate Professor of Medicine] : Associate Professor of Medicine [[facility]] : [facility]

## 2022-12-13 ENCOUNTER — APPOINTMENT (OUTPATIENT)
Dept: PULMONOLOGY | Facility: CLINIC | Age: 62
End: 2022-12-13

## 2022-12-13 VITALS
SYSTOLIC BLOOD PRESSURE: 131 MMHG | WEIGHT: 168 LBS | OXYGEN SATURATION: 96 % | HEIGHT: 68 IN | RESPIRATION RATE: 17 BRPM | TEMPERATURE: 98.2 F | BODY MASS INDEX: 25.46 KG/M2 | HEART RATE: 78 BPM | DIASTOLIC BLOOD PRESSURE: 82 MMHG

## 2022-12-13 PROCEDURE — 99215 OFFICE O/P EST HI 40 MIN: CPT

## 2022-12-13 RX ORDER — PREDNISONE 10 MG/1
10 TABLET ORAL DAILY
Qty: 7 | Refills: 0 | Status: DISCONTINUED | COMMUNITY
Start: 2022-06-13 | End: 2022-12-13

## 2022-12-13 NOTE — HISTORY OF PRESENT ILLNESS
[Stable] : are stable [None] : ~He/She~ has no significant interval events [Difficulty Breathing During Exertion] : stable dyspnea on exertion [Feelings Of Weakness On Exertion] : stable exercise intolerance [Cough] : denies coughing [Wheezing] : denies wheezing [Regional Soft Tissue Swelling Both Lower Extremities] : denies lower extremity edema [Chest Pain Or Discomfort] : denies chest pain [Fever] : denies fever [0  -  Nothing at all] : 0, nothing at all [Date: ___] : was performed [unfilled] [TextBox_4] : -61 F with scleroderma ( diagnosed 2008, follows with Dr. Goldberg, not on any treatment), HTN, sickle cell trait here for initial evaluation. ---SHE USED TO F/U  WITH DR PATIÑO  BEFORE  BUT HAS NOT FOLLOWWED UP WITH HIM  FOR A WHILE-   Her scleroderma has been associated with skin manifestations including digital ulcers and Raynaud's, GERD , and now possible pulmonary manifestations. She takes nifedipine for raynaud's and vitamins. She reports pedal edema occasionally. denies orthopnea, PND, cough. -----H/O ANTI ENDOMYSIAL ANTIBODIES POSITIVE?CELAIC ---------------------------SEEING  A 'HOLISTIC IMMUNOLOGIST' \par She has LIMITED exercise capacity mainly due to joint pains and myalgias she reports.---------------\par CT chest Jan 2014 - Minimal subpleural ground glass attenuation in b/l lower lung fields, rt lung calcified granuloma, RML 4mm nodule and 2.6 cm thyroid nodule.-------------\par \par ct chest 12/2108 unchanged-\par \par PFTs---------APR 2018------ FVC 2.54, FEV1 1.85, FEV1/FVC 73, ----------mildly reduced DLCO, normal spirometry , increased RV. \par PFT 2022----------SPIROMETRY NORMAL, LUNG VOLUMES NORMAL -DECREASE IN DLCO\par \par pft 3/2019 normal lung volumes, decreased  dlco\par \par \par PSG 2021---  RDI 5.3 \par Of note, pt has not followed with Dr. martino for scleroderma. She reports using "holistic medicines" to treat her scleroderma. \par PMD - Dr. Sin, \par \par ECHO   -----OCT 2017--------\par . Normal trileaflet aortic valve.\par 2. Increased relative wall thickness with normal left\par ventricular mass index, consistent with concentric left\par ventricular remodeling.\par 3. Normal left ventricular systolic function. No segmental\par wall motion abnormalities.\par 4. Normal right ventricular size and function.\par \par    CPAP 61887----EU NAGHAVI---HAD A CPAP STUDY AT HIS OFFICE AND WAS TOLD SHE SHOULD BE ON CPAP 12 CM OF H2O------\par \par echo 7/2020 normal study\par ct chest 10/2020 IMPRESSION:\par Bilateral lower lobe reticular and groundglass opacities with subpleural sparing are increased compared to prior studies. No traction bronchiectasis or honeycombing\par \par \par ct chest 9/2021 IMPRESSION: Ground glass/reticular opacities in the peripheral portions of both lower lobes have not significantly changed when compared to previous exams.\par -----9/2017---ENDOSCOPY --SCHATZKI'S  RING, , ESOPHAGEAL OBSTRUCTION.[ DR SUZANNE GUERIN] \par oct 2017 doing well-mild ORTIZ and mild dry cough\par declines influenza vac\par \par \par nov 2020   Scleroderma- follows rheum Dr Goldberg. -FEELS  THE SAME  -  Hypothyroid--  ON ARMOUR THYROID] Follows endo Dr Dionne De Paz \par JUNE 2021--FEELS BETTER ON CPAP 12 AND  NEBULIZER--------NEEDS CT CHEST---   ECHO DR PRATHER\par \par 10/2021 psg w/AHI 5\par \par 11/2021 reports covid virus  approx 10/10  "allergy symptoms" loss of smell and taste but overall symptoms were mild d/t fully vaccinated \par Now feels good , no resp symptoms--- results of the sleep study were discussed she has minimal sleep apnea----\par \par feb 2022--------feels  better----- pft  stable---  6 mwt -2022 ---   425 meters--repeat echo laer this year---fu with dr goldberg for scleroderma--on armour thyroid\par june 2022---    -fu with dr goldberg for scleroderma--on armour thyroid  -----complaining of sore throat we will do a nasal swab prednisone Z-Messi ------ she will call us in a week to let us know how she is doing ------follow-up in 3 months\par \par \par \par 12/2022  scleroderma/ild--resp wise stable, follows rheum Dr Goldberg\par URI last month-treated w/zpack, medrol and nebs by PMD\par hypothyroid- on armour thyroid- follows endo Dr Dionne De Paz [Oxygen] : the patient uses no supplemental oxygen [de-identified] : FVC 72, FEV1 71, FEV1/FVC 78, , DLCO 72

## 2022-12-13 NOTE — DISCUSSION/SUMMARY
[FreeTextEntry1] : -------ASSESSMENT AND PLAN---61   F with scleroderma, GERD , here for evaluation of shortness of breath for pulmonary manifestations of scleroderma also hypothyroidism-..\par 1 Sclerdoerma--follow up with Dr Goldberg\par 2- previous  CT chest unchanged- mild ILD- repeat HRCT chest \par 3- Hypothyroidism--on armour thyroid  f/u  endocrinology----DR. LAITH VILLASEÑOR----\par 4- UTD w/covid vac\par 5- home exercises\par 6- declined influenza vac\par 7- repeat echo\par 8- -labs drawn in our office today\par 9- f/u in 3-4m\par \par ------Thanks for allowing  me to participate  in the care of this patient.  Patient at this time  will follow  the above mentioned recommendations and return back for follow up visit. If you have any questions  I can be reached  at [phone]  or  [phone].  \par \par Malu Villaseñor MD, FCCP \par Director, Pulmonary Hypertension Program \par [facility] \par Division of Pulmonary, Critical Care and Sleep Medicine \par  Professor of Medicine \par [employer]\par \par \par IMAN NewbyC\par

## 2022-12-15 LAB
25(OH)D3 SERPL-MCNC: 79.1 NG/ML
ALBUMIN SERPL ELPH-MCNC: 4.2 G/DL
ALP BLD-CCNC: 70 U/L
ALT SERPL-CCNC: 17 U/L
ANION GAP SERPL CALC-SCNC: 12 MMOL/L
AST SERPL-CCNC: 20 U/L
BASOPHILS # BLD AUTO: 0.03 K/UL
BASOPHILS NFR BLD AUTO: 0.6 %
BILIRUB SERPL-MCNC: 0.2 MG/DL
BUN SERPL-MCNC: 18 MG/DL
CALCIUM SERPL-MCNC: 9.2 MG/DL
CHLORIDE SERPL-SCNC: 107 MMOL/L
CO2 SERPL-SCNC: 25 MMOL/L
COVID-19 NUCLEOCAPSID  GAM ANTIBODY INTERPRETATION: POSITIVE
COVID-19 SPIKE DOMAIN ANTIBODY INTERPRETATION: POSITIVE
CREAT SERPL-MCNC: 0.79 MG/DL
EGFR: 85 ML/MIN/1.73M2
EOSINOPHIL # BLD AUTO: 0.09 K/UL
EOSINOPHIL NFR BLD AUTO: 1.9 %
GLUCOSE SERPL-MCNC: 86 MG/DL
HCT VFR BLD CALC: 34.9 %
HGB BLD-MCNC: 11.3 G/DL
IMM GRANULOCYTES NFR BLD AUTO: 0.2 %
LYMPHOCYTES # BLD AUTO: 1.87 K/UL
LYMPHOCYTES NFR BLD AUTO: 39.2 %
MAN DIFF?: NORMAL
MCHC RBC-ENTMCNC: 25.7 PG
MCHC RBC-ENTMCNC: 32.4 GM/DL
MCV RBC AUTO: 79.3 FL
MONOCYTES # BLD AUTO: 0.42 K/UL
MONOCYTES NFR BLD AUTO: 8.8 %
NEUTROPHILS # BLD AUTO: 2.35 K/UL
NEUTROPHILS NFR BLD AUTO: 49.3 %
PLATELET # BLD AUTO: 206 K/UL
POTASSIUM SERPL-SCNC: 4.1 MMOL/L
PROT SERPL-MCNC: 6.4 G/DL
RBC # BLD: 4.4 M/UL
RBC # FLD: 14.8 %
SARS-COV-2 AB SERPL IA-ACNC: >250 U/ML
SARS-COV-2 AB SERPL QL IA: 86 INDEX
SODIUM SERPL-SCNC: 144 MMOL/L
TOTAL IGE SMQN RAST: 22 KU/L
TSH SERPL-ACNC: 0.75 UIU/ML
VIT B12 SERPL-MCNC: 690 PG/ML
WBC # FLD AUTO: 4.77 K/UL

## 2023-02-24 ENCOUNTER — APPOINTMENT (OUTPATIENT)
Dept: CV DIAGNOSITCS | Facility: HOSPITAL | Age: 63
End: 2023-02-24

## 2023-02-24 ENCOUNTER — APPOINTMENT (OUTPATIENT)
Dept: ORTHOPEDIC SURGERY | Facility: CLINIC | Age: 63
End: 2023-02-24

## 2023-03-02 ENCOUNTER — APPOINTMENT (OUTPATIENT)
Dept: RADIOLOGY | Facility: CLINIC | Age: 63
End: 2023-03-02
Payer: COMMERCIAL

## 2023-03-02 ENCOUNTER — APPOINTMENT (OUTPATIENT)
Dept: ULTRASOUND IMAGING | Facility: CLINIC | Age: 63
End: 2023-03-02
Payer: COMMERCIAL

## 2023-03-02 ENCOUNTER — APPOINTMENT (OUTPATIENT)
Dept: CT IMAGING | Facility: CLINIC | Age: 63
End: 2023-03-02
Payer: COMMERCIAL

## 2023-03-02 ENCOUNTER — OUTPATIENT (OUTPATIENT)
Dept: OUTPATIENT SERVICES | Facility: HOSPITAL | Age: 63
LOS: 1 days | End: 2023-03-02
Payer: COMMERCIAL

## 2023-03-02 DIAGNOSIS — Z00.8 ENCOUNTER FOR OTHER GENERAL EXAMINATION: ICD-10-CM

## 2023-03-02 PROCEDURE — 77080 DXA BONE DENSITY AXIAL: CPT

## 2023-03-02 PROCEDURE — 76830 TRANSVAGINAL US NON-OB: CPT | Mod: 26

## 2023-03-02 PROCEDURE — 76700 US EXAM ABDOM COMPLETE: CPT | Mod: 26

## 2023-03-02 PROCEDURE — 76856 US EXAM PELVIC COMPLETE: CPT | Mod: 26

## 2023-03-02 PROCEDURE — 77080 DXA BONE DENSITY AXIAL: CPT | Mod: 26

## 2023-03-02 PROCEDURE — 76700 US EXAM ABDOM COMPLETE: CPT

## 2023-03-02 PROCEDURE — 71250 CT THORAX DX C-: CPT

## 2023-03-02 PROCEDURE — 76830 TRANSVAGINAL US NON-OB: CPT

## 2023-03-02 PROCEDURE — 71250 CT THORAX DX C-: CPT | Mod: 26

## 2023-03-02 PROCEDURE — 76856 US EXAM PELVIC COMPLETE: CPT

## 2023-03-23 ENCOUNTER — NON-APPOINTMENT (OUTPATIENT)
Age: 63
End: 2023-03-23

## 2023-03-24 ENCOUNTER — APPOINTMENT (OUTPATIENT)
Dept: ORTHOPEDIC SURGERY | Facility: CLINIC | Age: 63
End: 2023-03-24
Payer: COMMERCIAL

## 2023-03-24 VITALS — WEIGHT: 168 LBS | HEIGHT: 68 IN | BODY MASS INDEX: 25.46 KG/M2

## 2023-03-24 DIAGNOSIS — M51.26 OTHER INTERVERTEBRAL DISC DISPLACEMENT, LUMBAR REGION: ICD-10-CM

## 2023-03-24 DIAGNOSIS — M47.814 SPONDYLOSIS W/OUT MYELOPATHY OR RADICULOPATHY, THORACIC REGION: ICD-10-CM

## 2023-03-24 DIAGNOSIS — M54.16 RADICULOPATHY, LUMBAR REGION: ICD-10-CM

## 2023-03-24 PROCEDURE — 72100 X-RAY EXAM L-S SPINE 2/3 VWS: CPT

## 2023-03-24 PROCEDURE — 20552 NJX 1/MLT TRIGGER POINT 1/2: CPT

## 2023-03-24 PROCEDURE — 99214 OFFICE O/P EST MOD 30 MIN: CPT | Mod: 25

## 2023-03-24 PROCEDURE — 72170 X-RAY EXAM OF PELVIS: CPT

## 2023-03-24 PROCEDURE — 72070 X-RAY EXAM THORAC SPINE 2VWS: CPT

## 2023-03-24 PROCEDURE — J3490M: CUSTOM

## 2023-03-24 NOTE — PROCEDURE
[Trigger point 1-2 muscle groups] : trigger point 1-2 muscle groups [Right] : of the right [Thoracic paraspinal muscle] : thoracic paraspinal muscle [Pain] : pain [Alcohol] : alcohol [Betadine] : betadine [Ethyl Chloride sprayed topically] : ethyl chloride sprayed topically [___ cc    1%] : Lidocaine ~Vcc of 1%  [___ cc    0.25%] : Bupivacaine (Marcaine) ~Vcc of 0.25%  [___ cc    10mg] : Triamcinolone (Kenalog) ~Vcc of 10 mg

## 2023-03-26 NOTE — DISCUSSION/SUMMARY
[de-identified] : reviewed the case/images with her \par \par pain continues in the back \par rec nsaids (has Naprosyn)\par TPI tolerated well \par fu 3 months

## 2023-03-26 NOTE — HISTORY OF PRESENT ILLNESS
[2] : 2 [Dull/Aching] : dull/aching [Localized] : localized [Intermittent] : intermittent [Full time] : Work status: full time [Lower back] : lower back [de-identified] : 9/23/19\par \par 10/22/21: Here for fu for the lower back pain - at times shooting down the left leg- overall doing a bit better with the\par lower back\par No recent PT\par had covid\par Naprosyn at times\par \par 5/30/22: here for fu - plan at last was "pain continues in the back - rec nsaids (has Naprosyn)\par PT" - overall had a flare up of back pain\par lower back pain and down the left leg - stiff around the left hip \par \par she had a flare up and was seen in the UC and given oral steroids and had a neck xrays today\par \par Was sent to PT - and she went to PT \par she is trying to avoid nsaids as much as possible \par \par scleroderma is stable no change in medications \par \par xrays today:\par T spine - negative\par L spine - grade 1 DS at L3-4 and L4-5\par AP PELVIS - no obvious fracture \par \par 3/24/23: HEre for pain in the back - mostly in the middle back on the right side of the spine \par Lower back pain across the lumbar - had a scaitica flare up which settled down \par \par Goes to hand therapy \par Has been to PT here and was given exercises\par using natrual aids like curcumen if it flares up \par using naprosyn at times as well \par \par xrays today:\par T spine - negative\par L spine - grade 1 DS at L3-4 and L4-5\par AP PELVIS - no obvious fracture\par  [] : Post Surgical Visit: no [FreeTextEntry5] : Pt is here for follow up of back. Pain has increased and comes intermittently.  [de-identified] : PT/HEP

## 2023-03-28 ENCOUNTER — OUTPATIENT (OUTPATIENT)
Dept: OUTPATIENT SERVICES | Facility: HOSPITAL | Age: 63
LOS: 1 days | End: 2023-03-28
Payer: COMMERCIAL

## 2023-03-28 ENCOUNTER — APPOINTMENT (OUTPATIENT)
Dept: CV DIAGNOSITCS | Facility: HOSPITAL | Age: 63
End: 2023-03-28

## 2023-03-28 DIAGNOSIS — J84.9 INTERSTITIAL PULMONARY DISEASE, UNSPECIFIED: ICD-10-CM

## 2023-03-28 PROCEDURE — 93306 TTE W/DOPPLER COMPLETE: CPT | Mod: 26

## 2023-04-11 ENCOUNTER — APPOINTMENT (OUTPATIENT)
Dept: PULMONOLOGY | Facility: CLINIC | Age: 63
End: 2023-04-11
Payer: COMMERCIAL

## 2023-04-11 VITALS
HEIGHT: 68 IN | SYSTOLIC BLOOD PRESSURE: 126 MMHG | WEIGHT: 173.31 LBS | BODY MASS INDEX: 26.27 KG/M2 | TEMPERATURE: 97.1 F | RESPIRATION RATE: 15 BRPM | OXYGEN SATURATION: 98 % | DIASTOLIC BLOOD PRESSURE: 78 MMHG | HEART RATE: 79 BPM

## 2023-04-11 DIAGNOSIS — J84.9 INTERSTITIAL PULMONARY DISEASE, UNSPECIFIED: ICD-10-CM

## 2023-04-11 PROCEDURE — 99214 OFFICE O/P EST MOD 30 MIN: CPT

## 2023-04-11 NOTE — REASON FOR VISIT
[Follow-Up] : a follow-up visit [Shortness of Breath] : shortness of breath [ILD] : ILD [TextBox_44] : DYSPNEA

## 2023-04-11 NOTE — REVIEW OF SYSTEMS
[Dry Mouth] : dry mouth [As Noted in HPI] : as noted in HPI [Heartburn] : heartburn [Reflux] : reflux [Myalgias] : myalgias [Arthralgias] : arthralgias [Raynaud] : Raynaud's phenomenon was observed [Scleroderma] : scleroderma [Negative] : Sleep Disorder [Fever] : no fever [Postnasal Drip] : no postnasal drip [Sinus Problems] : no sinus problems [Mouth Ulcers] : no mouth ulcers [Cough] : no cough [PND] : no PND [Orthopnea] : no orthopnea [Edema] : ~T edema was not present

## 2023-04-11 NOTE — DISCUSSION/SUMMARY
[FreeTextEntry1] : -------ASSESSMENT AND PLAN---61   F with scleroderma, GERD , here for evaluation of shortness of breath for pulmonary manifestations of scleroderma also hypothyroidism-..\par 1 Sclerdoerma--follow up with Dr Goldberg\par 2- previous  CT chest unchanged- mild ILD- repeat HRCT chest annually\par 3- Hypothyroidism--on armour thyroid  f/u  endocrinology----DR. LAITH VILLASEÑOR----\par 4- UTD w/covid vac\par 5- home exercises advised\par 6- declined influenza vac\par 7- repeat echo annually \par 8- ESOPHAGEAL INVOLVEMENT--F/U GI\par f/u in sept w/pft\par \par ------Thanks for allowing  me to participate  in the care of this patient.  Patient at this time  will follow  the above mentioned recommendations and return back for follow up visit. If you have any questions  I can be reached  at  [phone].  \par \par Malu Villaseñor MD, FCCP \par \par

## 2023-04-11 NOTE — PHYSICAL EXAM
[General Appearance - Well Developed] : well developed [Normal Appearance] : normal appearance [Well Groomed] : well groomed [General Appearance - Well Nourished] : well nourished [No Deformities] : no deformities [General Appearance - In No Acute Distress] : no acute distress [Normal Conjunctiva] : the conjunctiva exhibited no abnormalities [II] : II [Jugular Venous Distention Increased] : there was no jugular-venous distention [Heart Sounds] : normal S1 and S2 [Lesions: ____] : lesions [unfilled] [Abdomen Soft] : soft [Abdomen Tenderness] : non-tender [Abnormal Walk] : normal gait [Gait - Sufficient For Exercise Testing] : the gait was sufficient for exercise testing [Skin Color & Pigmentation] : normal skin color and pigmentation [] : no rash [No Venous Stasis] : no venous stasis [Skin Lesions] : no skin lesions [No Skin Ulcers] : no skin ulcer [No Xanthoma] : no  xanthoma was observed [Deep Tendon Reflexes (DTR)] : deep tendon reflexes were 2+ and symmetric [Sensation] : the sensory exam was normal to light touch and pinprick [No Focal Deficits] : no focal deficits [Impaired Insight] : insight and judgment were intact [Nail Clubbing] : no clubbing of the fingernails [Oriented To Time, Place, And Person] : oriented to person, place, and time [Affect] : the affect was normal [Mood] : the mood was normal [Edema] : no peripheral edema present [FreeTextEntry1] : narrowed mouth opening, skin tightening

## 2023-04-11 NOTE — HISTORY OF PRESENT ILLNESS
[Never] : never [Stable] : are stable [None] : ~He/She~ has no significant interval events [Difficulty Breathing During Exertion] : stable dyspnea on exertion [Feelings Of Weakness On Exertion] : stable exercise intolerance [Cough] : denies coughing [Wheezing] : denies wheezing [Regional Soft Tissue Swelling Both Lower Extremities] : denies lower extremity edema [Chest Pain Or Discomfort] : denies chest pain [Fever] : denies fever [0  -  Nothing at all] : 0, nothing at all [Date: ___] : was performed [unfilled] [TextBox_4] : 63 yo  F with scleroderma ( diagnosed 2008, follows with Dr. Goldberg, not on any treatment), HTN, sickle cell trait here for initial evaluation. ---SHE USED TO F/U  WITH DR PATIÑO  BEFORE  BUT HAS NOT FOLLOWWED UP WITH HIM  FOR A WHILE-   Her scleroderma has been associated with skin manifestations including digital ulcers and Raynaud's, GERD , and now possible pulmonary manifestations. She takes nifedipine for raynaud's and vitamins. She reports pedal edema occasionally. denies orthopnea, PND, cough. -----H/O ANTI ENDOMYSIAL ANTIBODIES POSITIVE?CELAIC ---------------------------SEEING  A 'HOLISTIC IMMUNOLOGIST' \par She has LIMITED exercise capacity mainly due to joint pains and myalgias she reports.---------------\par CT chest Jan 2014 - Minimal subpleural ground glass attenuation in b/l lower lung fields, rt lung calcified granuloma, RML 4mm nodule and 2.6 cm thyroid nodule.-------------\par \par ct chest 12/2108 unchanged-\par \par \par ct chest 10/2020 IMPRESSION:\par Bilateral lower lobe reticular and groundglass opacities with subpleural sparing are increased compared to prior studies. No traction bronchiectasis or honeycombing\par \par \par ct chest 9/2021 IMPRESSION: Ground glass/reticular opacities in the peripheral portions of both lower lobes have not significantly changed when compared to previous exams.\par \par CT CHEST jan 2023- RML subcent calcified granuloma, RUL 4mm nodule, 2.6cm left thyroid nodule\par \par PFTs---------APR 2018------ FVC 2.54, FEV1 1.85, FEV1/FVC 73, ----------mildly reduced DLCO, normal spirometry , increased RV. \par PFT 2022----------SPIROMETRY NORMAL, LUNG VOLUMES NORMAL -DECREASE IN DLCO\par \par pft 3/2019 normal lung volumes, decreased  dlco\par \par \par PSG 2021---  RDI 5.3 \par Of note, pt has not followed with Dr. martino for scleroderma. She reports using "holistic medicines" to treat her scleroderma. \par PMD - Dr. Sin, \par \par ECHO   -----OCT 2017--------\par . Normal trileaflet aortic valve.\par 2. Increased relative wall thickness with normal left\par ventricular mass index, consistent with concentric left\par ventricular remodeling.\par 3. Normal left ventricular systolic function. No segmental\par wall motion abnormalities.\par 4. Normal right ventricular size and function.\par \par    CPAP 61626----AP NAGHAVI---HAD A CPAP STUDY AT HIS OFFICE AND WAS TOLD SHE SHOULD BE ON CPAP 12 CM OF H2O------\par \par echo 7/2020 normal study\par \par ECHO 2023 est pasp 33mmhg, mild MR\par \par -----9/2017---ENDOSCOPY --SCHATZKI'S  RING, , ESOPHAGEAL OBSTRUCTION.[ DR SUZANNE GUERIN] \par oct 2017 doing well-mild ORTIZ and mild dry cough\par declines influenza vac\par \par \par nov 2020   Scleroderma- follows rheum Dr Goldberg. -FEELS  THE SAME  -  Hypothyroid--  ON ARMOUR THYROID] Follows endo Dr Dionne De Paz \par JUNE 2021--FEELS BETTER ON CPAP 12 AND  NEBULIZER--------NEEDS CT CHEST---   ECHO DR PRATHER\par \par 10/2021 psg w/AHI 5\par \par 11/2021 reports covid virus  approx 10/10  "allergy symptoms" loss of smell and taste but overall symptoms were mild d/t fully vaccinated \par Now feels good , no resp symptoms--- results of the sleep study were discussed she has minimal sleep apnea----\par \par feb 2022--------feels  better----- pft  stable---  6 mwt -2022 ---   425 meters--repeat echo laer this year---fu with dr goldberg for scleroderma--on armour thyroid\par june 2022---    -fu with dr goldberg for scleroderma--on armour thyroid  -----complaining of sore throat we will do a nasal swab prednisone Z-Messi ------ she will call us in a week to let us know how she is doing ------follow-up in 3 months\par \par \par \par 12/2022  scleroderma/ild--resp wise stable, follows rheum Dr Goldberg\par URI last month-treated w/zpack, medrol and nebs by PMD\par hypothyroid- on armour thyroid- follows endo Dr Dionne De Paz\par \par \par 4/2023 scleroderma/ild--resp wise stable, follows rheum Dr Goldberg\par hypothyroid- on armour thyroid- follows endo Dr Dionne De Paz\par  [Oxygen] : the patient uses no supplemental oxygen [de-identified] : FVC 72, FEV1 71, FEV1/FVC 78, , DLCO 72

## 2023-09-18 ENCOUNTER — APPOINTMENT (OUTPATIENT)
Dept: PULMONOLOGY | Facility: CLINIC | Age: 63
End: 2023-09-18
Payer: COMMERCIAL

## 2023-09-18 VITALS
SYSTOLIC BLOOD PRESSURE: 123 MMHG | BODY MASS INDEX: 26.92 KG/M2 | OXYGEN SATURATION: 100 % | HEART RATE: 66 BPM | DIASTOLIC BLOOD PRESSURE: 78 MMHG | HEIGHT: 66.93 IN | WEIGHT: 171.5 LBS

## 2023-09-18 PROCEDURE — 99215 OFFICE O/P EST HI 40 MIN: CPT | Mod: 25

## 2023-09-18 PROCEDURE — 94010 BREATHING CAPACITY TEST: CPT

## 2023-09-18 PROCEDURE — 94726 PLETHYSMOGRAPHY LUNG VOLUMES: CPT

## 2023-09-18 PROCEDURE — 94729 DIFFUSING CAPACITY: CPT

## 2023-09-18 PROCEDURE — ZZZZZ: CPT

## 2024-03-12 ENCOUNTER — OUTPATIENT (OUTPATIENT)
Dept: OUTPATIENT SERVICES | Facility: HOSPITAL | Age: 64
LOS: 1 days | End: 2024-03-12
Payer: COMMERCIAL

## 2024-03-12 ENCOUNTER — APPOINTMENT (OUTPATIENT)
Dept: CT IMAGING | Facility: CLINIC | Age: 64
End: 2024-03-12
Payer: COMMERCIAL

## 2024-03-12 DIAGNOSIS — Z00.8 ENCOUNTER FOR OTHER GENERAL EXAMINATION: ICD-10-CM

## 2024-03-12 PROCEDURE — 71250 CT THORAX DX C-: CPT | Mod: 26

## 2024-03-12 PROCEDURE — 71250 CT THORAX DX C-: CPT

## 2024-04-09 ENCOUNTER — APPOINTMENT (OUTPATIENT)
Dept: PULMONOLOGY | Facility: CLINIC | Age: 64
End: 2024-04-09
Payer: COMMERCIAL

## 2024-04-09 VITALS
SYSTOLIC BLOOD PRESSURE: 126 MMHG | WEIGHT: 174 LBS | TEMPERATURE: 97.5 F | DIASTOLIC BLOOD PRESSURE: 75 MMHG | HEART RATE: 77 BPM | OXYGEN SATURATION: 95 % | RESPIRATION RATE: 15 BRPM | HEIGHT: 66.93 IN | BODY MASS INDEX: 27.31 KG/M2

## 2024-04-09 PROCEDURE — 99214 OFFICE O/P EST MOD 30 MIN: CPT

## 2024-04-09 NOTE — HISTORY OF PRESENT ILLNESS
[Never] : never [Stable] : are stable [None] : ~He/She~ has no significant interval events [Difficulty Breathing During Exertion] : stable dyspnea on exertion [Feelings Of Weakness On Exertion] : stable exercise intolerance [Cough] : denies coughing [Wheezing] : denies wheezing [Regional Soft Tissue Swelling Both Lower Extremities] : denies lower extremity edema [Chest Pain Or Discomfort] : denies chest pain [Fever] : denies fever [0  -  Nothing at all] : 0, nothing at all [Date: ___] : was performed [unfilled] [TextBox_4] : 64 yo  F with scleroderma ( diagnosed 2008, follows with Dr. Goldberg, not on any treatment), HTN, sickle cell trait here for initial evaluation. ---SHE USED TO F/U  WITH DR PATIÑO  BEFORE  BUT HAS NOT FOLLOWWED UP WITH HIM  FOR A WHILE-   Her scleroderma has been associated with skin manifestations including digital ulcers and Raynaud's, GERD , and now possible pulmonary manifestations. She takes nifedipine for raynaud's and vitamins. She reports pedal edema occasionally. denies orthopnea, PND, cough. -----H/O ANTI ENDOMYSIAL ANTIBODIES POSITIVE?CELAIC ---------------------------SEEING  A 'HOLISTIC IMMUNOLOGIST'  She has LIMITED exercise capacity mainly due to joint pains and myalgias she reports.--------------- CT chest Jan 2014 - Minimal subpleural ground glass attenuation in b/l lower lung fields, rt lung calcified granuloma, RML 4mm nodule and 2.6 cm thyroid nodule.-------------  ct chest 12/2108 unchanged-   ct chest 10/2020 IMPRESSION: Bilateral lower lobe reticular and groundglass opacities with subpleural sparing are increased compared to prior studies. No traction bronchiectasis or honeycombing   ct chest 9/2021 IMPRESSION: Ground glass/reticular opacities in the peripheral portions of both lower lobes have not significantly changed when compared to previous exams.  CT CHEST jan 2023- RML subcent calcified granuloma, RUL 4mm nodule, 2.6cm left thyroid nodule  PFTs---------APR 2018------ FVC 2.54, FEV1 1.85, FEV1/FVC 73, ----------mildly reduced DLCO, normal spirometry , increased RV.  PFT 2022----------SPIROMETRY NORMAL, LUNG VOLUMES NORMAL -DECREASE IN DLCO  pft 3/2019 normal lung volumes, decreased  dlco   PSG 2021---  RDI 5.3  Of note, pt has not followed with Dr. martino for scleroderma. She reports using "holistic medicines" to treat her scleroderma.  PMD - Dr. Sin,   ECHO   -----OCT 2017-------- . Normal trileaflet aortic valve. 2. Increased relative wall thickness with normal left ventricular mass index, consistent with concentric left ventricular remodeling. 3. Normal left ventricular systolic function. No segmental wall motion abnormalities. 4. Normal right ventricular size and function.     CPAP 65244----BO NAGHAVI---HAD A CPAP STUDY AT HIS OFFICE AND WAS TOLD SHE SHOULD BE ON CPAP 12 CM OF H2O------  echo 7/2020 normal study  ECHO 2023 est pasp 33mmhg, mild MR  -----9/2017---ENDOSCOPY --SCHATZKI'S  RING, , ESOPHAGEAL OBSTRUCTION.[ DR SUZANNE GUERIN]  oct 2017 doing well-mild ORTIZ and mild dry cough declines influenza vac   nov 2020   Scleroderma- follows rheum Dr Goldberg. -FEELS  THE SAME  -  Hypothyroid--  ON ARMOUR THYROID] Follows endo Dr Dionne De Paz  JUNE 2021--FEELS BETTER ON CPAP 12 AND  NEBULIZER--------NEEDS CT CHEST---   ECHO DR PRATHER  10/2021 psg w/AHI 5  11/2021 reports covid virus  approx 10/10  "allergy symptoms" loss of smell and taste but overall symptoms were mild d/t fully vaccinated  Now feels good , no resp symptoms--- results of the sleep study were discussed she has minimal sleep apnea----  feb 2022--------feels  better----- pft  stable---  6 mwt -2022 ---   425 meters--repeat echo laer this year---fu with dr goldberg for scleroderma--on armour thyroid june 2022---    -fu with dr goldberg for scleroderma--on armour thyroid  -----complaining of sore throat we will do a nasal swab prednisone Z-Messi ------ she will call us in a week to let us know how she is doing ------follow-up in 3 months    12/2022  scleroderma/ild--resp wise stable, follows rheum Dr Goldberg URI last month-treated w/zpack, medrol and nebs by PMD hypothyroid- on armour thyroid- follows endo Dr Dionne De Paz   4/2023 scleroderma/ild--resp wise stable, follows rheum Dr Goldberg hypothyroid- on armour thyroid- follows endo Dr Dionne De Paz  9/2023 PFT normal lung volumes- decreased dlco  9/2023 scleroderma/ild--resp wise stable except for minimal occasional dry cough--- in the past have talked about treating it with an immunosuppressive agent but she has not been amenable to that----pft ----with mild decrease in DLCO s her PFTs are stable he feels she is stable with normal lung mechanics with decreased DLCO------- Follow rheum Dr Goldberg- last appt approx 6 m ago--- needs to follow-up with her rheumatologist hypothyroid managed by endo Dr Dionne De Paz  3/2024 ct chest LUNGS AND AIRWAYS: Patent central airways.  Mild peripheral reticular/ground glass opacities at the bilateral bases with subpleural sparing, unchanged. Stable 3 mm nodule in the left lower lobe (2:87). Small scattered calcified granulomas. PLEURA: No pleural effusion.  4/2024  scleroderma/ild--resp wise stable  Follow rheum Dr Goldberg-  -CT SCAN 3/2024-- STABLE [Oxygen] : the patient uses no supplemental oxygen [de-identified] : FVC 72, FEV1 71, FEV1/FVC 78, , DLCO 72

## 2024-04-09 NOTE — PHYSICAL EXAM
[General Appearance - Well Developed] : well developed [Normal Appearance] : normal appearance [Well Groomed] : well groomed [General Appearance - Well Nourished] : well nourished [No Deformities] : no deformities [General Appearance - In No Acute Distress] : no acute distress [Normal Conjunctiva] : the conjunctiva exhibited no abnormalities [II] : II [Jugular Venous Distention Increased] : there was no jugular-venous distention [Heart Sounds] : normal S1 and S2 [Edema] : no peripheral edema present [Lesions: ____] : lesions [unfilled] [Abdomen Soft] : soft [Abdomen Tenderness] : non-tender [Abnormal Walk] : normal gait [Gait - Sufficient For Exercise Testing] : the gait was sufficient for exercise testing [Skin Color & Pigmentation] : normal skin color and pigmentation [] : no rash [No Venous Stasis] : no venous stasis [Skin Lesions] : no skin lesions [No Skin Ulcers] : no skin ulcer [No Xanthoma] : no  xanthoma was observed [Deep Tendon Reflexes (DTR)] : deep tendon reflexes were 2+ and symmetric [Sensation] : the sensory exam was normal to light touch and pinprick [No Focal Deficits] : no focal deficits [Impaired Insight] : insight and judgment were intact [Nail Clubbing] : no clubbing of the fingernails [Oriented To Time, Place, And Person] : oriented to person, place, and time [Affect] : the affect was normal [Mood] : the mood was normal [FreeTextEntry1] : narrowed mouth opening, skin tightening

## 2024-04-09 NOTE — END OF VISIT
[FreeTextEntry3] :   I, Dr. Malu De Paz  personally performed the evaluation and management (E/M) services for this established patient who presents today with (a) new problem(s)/exacerbation of (an) existing condition(s). That E/M includes conducting the clinically appropriate interval history &/or exam, assessing all new/exacerbated conditions, and establishing a new plan of care. Today, my ANGELITA, Kirsten Morelos NP, , was here to observe my evaluation and management service for this new problem/exacerbated condition and follow the plan of care established by me going forward. [Time Spent: ___ minutes] : I have spent [unfilled] minutes of time on the encounter.

## 2024-04-09 NOTE — DISCUSSION/SUMMARY
[FreeTextEntry1] : -------ASSESSMENT AND PLAN---62   F with scleroderma, GERD , here for evaluation of shortness of breath for pulmonary manifestations of scleroderma also hypothyroidism-.. 1 Sclerdoerma--follow up with Dr Goldberg----gain I have again reemphasized that she follow-up with Dr. Goldberg and discuss about treatment for scleroderma from rheumatology point of view 2- previous  CT chest unchanged- mild ILD- repeat HRCT 2025  scleroderma/ild--resp wise stable except for minimal occasional dry cough--- in the past have talked about treating it with an immunosuppressive agent but she has not been amenable to that----pft---with mild decrease in DLCO s her PFTs are stable he feels she is stable with normal lung mechanics with decreased DLCO-------   3- Hypothyroidism--on armour thyroid  f/u  endocrinology----DR. LAITH VILLASEÑOR---- 4- w tloss and  home exercises advised 6-- repeat echo annually  7- dilated esophagus on imaging- advised GI consult and repeat endoscopy w/Dr Turk 8- f/u in 6m  ------Thanks for allowing  me to participate  in the care of this patient.  Patient at this time  will follow  the above mentioned recommendations and return back for follow up visit. If you have any questions  I can be reached  at  [phone].    Malu Villaseñor MD, [facility]

## 2024-07-16 ENCOUNTER — APPOINTMENT (OUTPATIENT)
Dept: PULMONOLOGY | Facility: CLINIC | Age: 64
End: 2024-07-16

## 2024-07-16 DIAGNOSIS — R05.3 CHRONIC COUGH: ICD-10-CM

## 2024-07-16 PROCEDURE — 99214 OFFICE O/P EST MOD 30 MIN: CPT

## 2024-07-16 RX ORDER — PREDNISONE 10 MG/1
10 TABLET ORAL DAILY
Qty: 14 | Refills: 1 | Status: ACTIVE | COMMUNITY
Start: 2024-07-16 | End: 1900-01-01

## 2024-07-17 LAB
HMPV RNA SPEC QL NAA+PROBE: DETECTED
RAPID RVP RESULT: DETECTED
SARS-COV-2 RNA PNL RESP NAA+PROBE: DETECTED

## 2024-07-22 RX ORDER — BENZONATATE 100 MG/1
100 CAPSULE ORAL 3 TIMES DAILY
Qty: 60 | Refills: 1 | Status: ACTIVE | COMMUNITY
Start: 2024-07-22 | End: 1900-01-01

## 2024-07-23 ENCOUNTER — APPOINTMENT (OUTPATIENT)
Dept: PULMONOLOGY | Facility: CLINIC | Age: 64
End: 2024-07-23
Payer: COMMERCIAL

## 2024-07-23 ENCOUNTER — NON-APPOINTMENT (OUTPATIENT)
Age: 64
End: 2024-07-23

## 2024-07-23 ENCOUNTER — APPOINTMENT (OUTPATIENT)
Dept: RADIOLOGY | Facility: IMAGING CENTER | Age: 64
End: 2024-07-23
Payer: COMMERCIAL

## 2024-07-23 VITALS
RESPIRATION RATE: 15 BRPM | SYSTOLIC BLOOD PRESSURE: 154 MMHG | HEIGHT: 66.93 IN | DIASTOLIC BLOOD PRESSURE: 76 MMHG | BODY MASS INDEX: 26.84 KG/M2 | HEART RATE: 75 BPM | WEIGHT: 171 LBS | TEMPERATURE: 97.8 F | OXYGEN SATURATION: 89 %

## 2024-07-23 PROCEDURE — 99214 OFFICE O/P EST MOD 30 MIN: CPT

## 2024-07-23 PROCEDURE — 71046 X-RAY EXAM CHEST 2 VIEWS: CPT | Mod: 26

## 2024-07-23 RX ORDER — PREDNISONE 5 MG/1
5 TABLET ORAL
Qty: 45 | Refills: 0 | Status: ACTIVE | COMMUNITY
Start: 2024-07-23 | End: 1900-01-01

## 2024-07-23 RX ORDER — AMOXICILLIN AND CLAVULANATE POTASSIUM 875; 125 MG/1; MG/1
875-125 TABLET, COATED ORAL
Qty: 20 | Refills: 0 | Status: ACTIVE | COMMUNITY
Start: 2024-07-23 | End: 1900-01-01

## 2024-07-23 NOTE — HISTORY OF PRESENT ILLNESS
[Never] : never [Stable] : are stable [None] : ~He/She~ has no significant interval events [Difficulty Breathing During Exertion] : stable dyspnea on exertion [Feelings Of Weakness On Exertion] : stable exercise intolerance [Cough] : denies coughing [Wheezing] : denies wheezing [Regional Soft Tissue Swelling Both Lower Extremities] : denies lower extremity edema [Chest Pain Or Discomfort] : denies chest pain [Fever] : denies fever [0  -  Nothing at all] : 0, nothing at all [Date: ___] : was performed [unfilled] [TextBox_4] : 64 yo  F with scleroderma ( diagnosed 2008, follows with Dr. Goldberg, not on any treatment), HTN, sickle cell trait here for initial evaluation. ---SHE USED TO F/U  WITH DR PATIÑO  BEFORE  BUT HAS NOT FOLLOWWED UP WITH HIM  FOR A WHILE-   Her scleroderma has been associated with skin manifestations including digital ulcers and Raynaud's, GERD , and now possible pulmonary manifestations. She takes nifedipine for raynaud's and vitamins. She reports pedal edema occasionally. denies orthopnea, PND, cough. -----H/O ANTI ENDOMYSIAL ANTIBODIES POSITIVE?CELAIC ---------------------------SEEING  A 'HOLISTIC IMMUNOLOGIST'  She has LIMITED exercise capacity mainly due to joint pains and myalgias she reports.--------------- CT chest Jan 2014 - Minimal subpleural ground glass attenuation in b/l lower lung fields, rt lung calcified granuloma, RML 4mm nodule and 2.6 cm thyroid nodule.-------------  ct chest 12/2108 unchanged-   ct chest 10/2020 IMPRESSION: Bilateral lower lobe reticular and groundglass opacities with subpleural sparing are increased compared to prior studies. No traction bronchiectasis or honeycombing   ct chest 9/2021 IMPRESSION: Ground glass/reticular opacities in the peripheral portions of both lower lobes have not significantly changed when compared to previous exams.  CT CHEST jan 2023- RML subcent calcified granuloma, RUL 4mm nodule, 2.6cm left thyroid nodule  PFTs---------APR 2018------ FVC 2.54, FEV1 1.85, FEV1/FVC 73, ----------mildly reduced DLCO, normal spirometry , increased RV.  PFT 2022----------SPIROMETRY NORMAL, LUNG VOLUMES NORMAL -DECREASE IN DLCO  pft 3/2019 normal lung volumes, decreased  dlco   PSG 2021---  RDI 5.3  Of note, pt has not followed with Dr. martino for scleroderma. She reports using "holistic medicines" to treat her scleroderma.  PMD - Dr. Sin,   ECHO   -----OCT 2017-------- . Normal trileaflet aortic valve. 2. Increased relative wall thickness with normal left ventricular mass index, consistent with concentric left ventricular remodeling. 3. Normal left ventricular systolic function. No segmental wall motion abnormalities. 4. Normal right ventricular size and function.     CPAP 90935----SZ NAGHAVI---HAD A CPAP STUDY AT HIS OFFICE AND WAS TOLD SHE SHOULD BE ON CPAP 12 CM OF H2O------  echo 7/2020 normal study  ECHO 2023 est pasp 33mmhg, mild MR  -----9/2017---ENDOSCOPY --SCHATZKI'S  RING, , ESOPHAGEAL OBSTRUCTION.[ DR SUZANNE GUERIN]  oct 2017 doing well-mild ORTIZ and mild dry cough declines influenza vac   nov 2020   Scleroderma- follows rheum Dr Goldberg. -FEELS  THE SAME  -  Hypothyroid--  ON ARMOUR THYROID] Follows endo Dr Dionne De Paz  JUNE 2021--FEELS BETTER ON CPAP 12 AND  NEBULIZER--------NEEDS CT CHEST---   ECHO DR PRATHER  10/2021 psg w/AHI 5  11/2021 reports covid virus  approx 10/10  "allergy symptoms" loss of smell and taste but overall symptoms were mild d/t fully vaccinated  Now feels good , no resp symptoms--- results of the sleep study were discussed she has minimal sleep apnea----  feb 2022--------feels  better----- pft  stable---  6 mwt -2022 ---   425 meters--repeat echo laer this year---fu with dr goldberg for scleroderma--on armour thyroid june 2022---    -fu with dr goldberg for scleroderma--on armour thyroid  -----complaining of sore throat we will do a nasal swab prednisone Z-Messi ------ she will call us in a week to let us know how she is doing ------follow-up in 3 months    12/2022  scleroderma/ild--resp wise stable, follows rheum Dr Goldberg URI last month-treated w/zpack, medrol and nebs by PMD hypothyroid- on armour thyroid- follows endo Dr Dionne De Paz   4/2023 scleroderma/ild--resp wise stable, follows rheum Dr Goldberg hypothyroid- on armour thyroid- follows endo Dr Dionne De Paz  9/2023 PFT normal lung volumes- decreased dlco  9/2023 scleroderma/ild--resp wise stable except for minimal occasional dry cough--- in the past have talked about treating it with an immunosuppressive agent but she has not been amenable to that----pft ----with mild decrease in DLCO s her PFTs are stable he feels she is stable with normal lung mechanics with decreased DLCO------- Follow rheum Dr Goldberg- last appt approx 6 m ago--- needs to follow-up with her rheumatologist hypothyroid managed by endo Dr Dionne De Paz  3/2024 ct chest LUNGS AND AIRWAYS: Patent central airways.  Mild peripheral reticular/ground glass opacities at the bilateral bases with subpleural sparing, unchanged. Stable 3 mm nodule in the left lower lobe (2:87). Small scattered calcified granulomas. PLEURA: No pleural effusion.  4/2024  scleroderma/ild--resp wise stable  Follow rheum Dr Goldberg-  -CT SCAN 3/2024-- STABLE  7/2024 recovering from covid on low-dose prednisone and has taken Z-Messi--- still dry cough -----will increase prednisone to 15 for a week and then decrease it back to 10 give her a course of Augmentin --she was outside the window for Paxlovid when she was diagnosed as she was having symptoms for more than a week before she had the nasal swab ------- [Oxygen] : the patient uses no supplemental oxygen [de-identified] : FVC 72, FEV1 71, FEV1/FVC 78, , DLCO 72

## 2024-07-23 NOTE — DISCUSSION/SUMMARY
[FreeTextEntry1] : -------ASSESSMENT AND PLAN---62   F with scleroderma, GERD , here for evaluation of shortness of breath for pulmonary manifestations of scleroderma also hypothyroidism-.. 1 Sclerdoerma--follow up with Dr Goldberg----gain I have again reemphasized that she follow-up with Dr. Goldberg and discuss about treatment for scleroderma from rheumatology point of view 2- previous  CT chest unchanged- mild ILD- repeat HRCT 2025  scleroderma/ild--resp wise stable except for minimal occasional dry cough--- in the past have talked about treating it with an immunosuppressive agent but she has not been amenable to that----pft---with mild decrease in DLCO s her PFTs are stable he feels she is stable with normal lung mechanics with decreased DLCO-------   3- Hypothyroidism--on Sebastian thyroid  f/u  endocrinology----DR. LAITH VILLASEÑOR---- 4- w tloss and  home exercises advised 6-- repeat echo annually  7- dilated esophagus on imaging- advised GI consult and repeat endoscopy w/Dr Turk 8- S/P COVID WITH hyperactive airways disease-----continue prednisone 15 mg p.o. daily and then decrease to 10 mg after a week take a course of Augmentin ----obtain chest x-ray  n------    f/u in 6m  ------Thanks for allowing  me to participate  in the care of this patient.  Patient at this time  will follow  the above mentioned recommendations and return back for follow up visit. If you have any questions  I can be reached  at  [phone].    Malu Villaseñor MD, [facility]

## 2024-08-09 ENCOUNTER — APPOINTMENT (OUTPATIENT)
Dept: ORTHOPEDIC SURGERY | Facility: CLINIC | Age: 64
End: 2024-08-09

## 2024-08-09 PROCEDURE — 99214 OFFICE O/P EST MOD 30 MIN: CPT

## 2024-08-09 PROCEDURE — 72070 X-RAY EXAM THORAC SPINE 2VWS: CPT

## 2024-08-09 NOTE — HISTORY OF PRESENT ILLNESS
[Lower back] : lower back [Dull/Aching] : dull/aching [Localized] : localized [Intermittent] : intermittent [Full time] : Work status: full time [10] : 10 [de-identified] : 9/23/19  10/22/21: Here for fu for the lower back pain - at times shooting down the left leg- overall doing a bit better with the lower back No recent PT had covid Naprosyn at times  5/30/22: here for fu - plan at last was "pain continues in the back - rec nsaids (has Naprosyn) PT" - overall had a flare up of back pain lower back pain and down the left leg - stiff around the left hip   she had a flare up and was seen in the UC and given oral steroids and had a neck xrays today  Was sent to PT - and she went to PT  she is trying to avoid nsaids as much as possible   scleroderma is stable no change in medications   xrays today: T spine - negative L spine - grade 1 DS at L3-4 and L4-5 AP PELVIS - no obvious fracture   3/24/23: HEre for pain in the back - mostly in the middle back on the right side of the spine  Lower back pain across the lumbar - had a scaitica flare up which settled down   Goes to hand therapy  Has been to PT here and was given exercises using natrual aids like curcumen if it flares up  using naprosyn at times as well   xrays today: T spine - negative L spine - grade 1 DS at L3-4 and L4-5 AP PELVIS - no obvious fracture  8/9/24: Here for fu - plan at last was "reviewed the case/images with her  pain continues in the back  rec nsaids (has Naprosyn) TPI tolerated well  fu 3 months "  Pain persists in the middle back - on the right side of the middle/lower back - having spasms in particular  has been diagnosed with osteoporosis   xrays today: T spine - negative xrays     [] : Post Surgical Visit: no [FreeTextEntry5] : Pt is here for follow up of back. Last visit 3/2023- TPI with good relief. Returning today with intermittent mid back pain, mostly right sided. Takes naproxen PRN with some relief.  [de-identified] : PT/HEP

## 2024-08-09 NOTE — DISCUSSION/SUMMARY
[Medication Risks Reviewed] : Medication risks reviewed [de-identified] : reviewed the case and the imaging with the patient  thoracic spinal pain and lumbar pain  xrays okay  discussion of the condition and treatment options cautions discussed questions answered discussion of natural history of the condition and what the next step would be TPi in the back if the pain flared up  naproxen consider MRi T spine

## 2024-09-04 DIAGNOSIS — R05.3 CHRONIC COUGH: ICD-10-CM

## 2024-09-05 ENCOUNTER — APPOINTMENT (OUTPATIENT)
Dept: PULMONOLOGY | Facility: CLINIC | Age: 64
End: 2024-09-05
Payer: COMMERCIAL

## 2024-09-05 VITALS — HEART RATE: 79 BPM | BODY MASS INDEX: 27.15 KG/M2 | HEIGHT: 67 IN | WEIGHT: 173 LBS | OXYGEN SATURATION: 100 %

## 2024-09-05 PROCEDURE — 94010 BREATHING CAPACITY TEST: CPT

## 2024-09-05 PROCEDURE — 94618 PULMONARY STRESS TESTING: CPT

## 2024-09-05 PROCEDURE — ZZZZZ: CPT

## 2024-09-05 PROCEDURE — 94729 DIFFUSING CAPACITY: CPT

## 2024-09-05 PROCEDURE — 94726 PLETHYSMOGRAPHY LUNG VOLUMES: CPT

## 2024-09-05 PROCEDURE — 99214 OFFICE O/P EST MOD 30 MIN: CPT | Mod: 25

## 2024-09-05 NOTE — DISCUSSION/SUMMARY
[FreeTextEntry1] : -------ASSESSMENT AND PLAN---63   F with scleroderma, GERD , here for evaluation of shortness of breath for pulmonary manifestations of scleroderma also hypothyroidism-.. 1 Sclerdoerma--follow up with Dr Goldberg----gain I have again reemphasized that she follow-up with Dr. Goldberg and discuss about treatment for scleroderma from rheumatology point of view 2- previous  CT chest unchanged- mild ILD- repeat HRCT 2025  scleroderma/ild--resp wise stable except for minimal occasional dry cough--- in the past have talked about treating it with an immunosuppressive agent but she has not been amenable to that----pft---with mild decrease in DLCO s her PFTs are stable he feels she is stable with normal lung mechanics with decreased DLCO-------PT 2024  STABLE---   3- Hypothyroidism--on armour thyroid  f/u  endocrinology----DR. LAITH VILLASEÑOR---- 4- w tloss and  home exercises advised 6-- repeat echo annually  7- dilated esophagus on imaging- advised GI consult and repeat endoscopy w/Dr Turk 8- S/P COVID WITH hyperactive airways disease-----continue prednisone 15 mg p.o. daily and then decrease to 10 mg after a week take a course of Augmentin ----obtain chest x-ray  n------    f/u in 6m  ------Thanks for allowing  me to participate  in the care of this patient.  Patient at this time  will follow  the above mentioned recommendations and return back for follow up visit. If you have any questions  I can be reached  at  [phone].    Malu Villaseñor MD, [facility]

## 2024-09-05 NOTE — HISTORY OF PRESENT ILLNESS
[Never] : never [Stable] : are stable [None] : ~He/She~ has no significant interval events [Difficulty Breathing During Exertion] : stable dyspnea on exertion [Feelings Of Weakness On Exertion] : stable exercise intolerance [Cough] : denies coughing [Wheezing] : denies wheezing [Regional Soft Tissue Swelling Both Lower Extremities] : denies lower extremity edema [Chest Pain Or Discomfort] : denies chest pain [Fever] : denies fever [0  -  Nothing at all] : 0, nothing at all [Date: ___] : was performed [unfilled] [TextBox_4] : 62 yo  F with scleroderma ( diagnosed 2008, follows with Dr. Goldberg, not on any treatment), HTN, sickle cell trait here for initial evaluation. ---SHE USED TO F/U  WITH DR PATIÑO  BEFORE  BUT HAS NOT FOLLOWWED UP WITH HIM  FOR A WHILE-   Her scleroderma has been associated with skin manifestations including digital ulcers and Raynaud's, GERD , and now possible pulmonary manifestations. She takes nifedipine for raynaud's and vitamins. She reports pedal edema occasionally. denies orthopnea, PND, cough. -----H/O ANTI ENDOMYSIAL ANTIBODIES POSITIVE?CELAIC ---------------------------SEEING  A 'HOLISTIC IMMUNOLOGIST'  She has LIMITED exercise capacity mainly due to joint pains and myalgias she reports.--------------- CT chest Jan 2014 - Minimal subpleural ground glass attenuation in b/l lower lung fields, rt lung calcified granuloma, RML 4mm nodule and 2.6 cm thyroid nodule.-------------  ct chest 12/2108 unchanged-   ct chest 10/2020 IMPRESSION: Bilateral lower lobe reticular and groundglass opacities with subpleural sparing are increased compared to prior studies. No traction bronchiectasis or honeycombing   ct chest 9/2021 IMPRESSION: Ground glass/reticular opacities in the peripheral portions of both lower lobes have not significantly changed when compared to previous exams.  CT CHEST jan 2023- RML subcent calcified granuloma, RUL 4mm nodule, 2.6cm left thyroid nodule  PFTs---------APR 2018------ FVC 2.54, FEV1 1.85, FEV1/FVC 73, ----------mildly reduced DLCO, normal spirometry , increased RV.  PFT 2022----------SPIROMETRY NORMAL, LUNG VOLUMES NORMAL -DECREASE IN DLCO  pft 3/2019 normal lung volumes, decreased  dlco   PSG 2021---  RDI 5.3  Of note, pt has not followed with Dr. martino for scleroderma. She reports using "holistic medicines" to treat her scleroderma.  PMD - Dr. Sin,   ECHO   -----OCT 2017-------- . Normal trileaflet aortic valve. 2. Increased relative wall thickness with normal left ventricular mass index, consistent with concentric left ventricular remodeling. 3. Normal left ventricular systolic function. No segmental wall motion abnormalities. 4. Normal right ventricular size and function.     CPAP 64833----LA NAGHAVI---HAD A CPAP STUDY AT HIS OFFICE AND WAS TOLD SHE SHOULD BE ON CPAP 12 CM OF H2O------  echo 7/2020 normal study  ECHO 2023 est pasp 33mmhg, mild MR  -----9/2017---ENDOSCOPY --SCHATZKI'S  RING, , ESOPHAGEAL OBSTRUCTION.[ DR SUZANNE GUERIN]  oct 2017 doing well-mild ORTIZ and mild dry cough declines influenza vac   nov 2020   Scleroderma- follows rheum Dr Goldberg. -FEELS  THE SAME  -  Hypothyroid--  ON ARMOUR THYROID] Follows endo Dr Dionne De Paz  JUNE 2021--FEELS BETTER ON CPAP 12 AND  NEBULIZER--------NEEDS CT CHEST---   ECHO DR PRATHER  10/2021 psg w/AHI 5  11/2021 reports covid virus  approx 10/10  "allergy symptoms" loss of smell and taste but overall symptoms were mild d/t fully vaccinated  Now feels good , no resp symptoms--- results of the sleep study were discussed she has minimal sleep apnea----  feb 2022--------feels  better----- pft  stable---  6 mwt -2022 ---   425 meters--repeat echo laer this year---fu with dr goldberg for scleroderma--on armour thyroid june 2022---    -fu with dr goldberg for scleroderma--on armour thyroid  -----complaining of sore throat we will do a nasal swab prednisone Z-Messi ------ she will call us in a week to let us know how she is doing ------follow-up in 3 months    12/2022  scleroderma/ild--resp wise stable, follows rheum Dr Goldberg URI last month-treated w/zpack, medrol and nebs by PMD hypothyroid- on armour thyroid- follows endo Dr Dionne De Paz   4/2023 scleroderma/ild--resp wise stable, follows rheum Dr Goldberg hypothyroid- on armour thyroid- follows endo Dr Dionne De Paz  9/2023 PFT normal lung volumes- decreased dlco  9/2023 scleroderma/ild--resp wise stable except for minimal occasional dry cough--- in the past have talked about treating it with an immunosuppressive agent but she has not been amenable to that----pft ----with mild decrease in DLCO s her PFTs are stable he feels she is stable with normal lung mechanics with decreased DLCO------- Follow rheum Dr Goldberg- last appt approx 6 m ago--- needs to follow-up with her rheumatologist hypothyroid managed by endo Dr Dionne De Paz  3/2024 ct chest LUNGS AND AIRWAYS: Patent central airways.  Mild peripheral reticular/ground glass opacities at the bilateral bases with subpleural sparing, unchanged. Stable 3 mm nodule in the left lower lobe (2:87). Small scattered calcified granulomas. PLEURA: No pleural effusion.  4/2024  scleroderma/ild--resp wise stable  Follow rheum Dr Goldberg-  -CT SCAN 3/2024-- STABLE  7/2024 recovering from covid on low-dose prednisone and has taken Z-Messi--- still dry cough -----will increase prednisone to 15 for a week and then decrease it back to 10 give her a course of Augmentin --she was outside the window for Paxlovid when she was diagnosed as she was having symptoms for more than a week before she had the nasal swab -------  9/2024 scleroderma/ILD PFT today mild restriction, decreased dlco saw PMD recently- had ai done and was started on advair/nebs as her braething arun "was not good" -report unavailable reports some cough w/change in weather and hoarsness since starting advair [Oxygen] : the patient uses no supplemental oxygen [de-identified] : FVC 72, FEV1 71, FEV1/FVC 78, , DLCO 72

## 2024-09-05 NOTE — DISCUSSION/SUMMARY
[FreeTextEntry1] : -------ASSESSMENT AND PLAN---63   F with scleroderma, GERD , here for evaluation of shortness of breath for pulmonary manifestations of scleroderma also hypothyroidism-.. 1 Sclerdoerma--follow up with Dr Goldberg----gain I have again reemphasized that she follow-up with Dr. Goldberg and discuss about treatment for scleroderma from rheumatology point of view 2- previous  CT chest unchanged- mild ILD- repeat HRCT 2025  scleroderma/ild--resp wise stable except for minimal occasional dry cough--- in the past have talked about treating it with an immunosuppressive agent but she has not been amenable to that----pft---with mild decrease in DLCO s her PFTs are stable he feels she is stable with normal lung mechanics with decreased DLCO-------PT 2024  STABLE---   3- Hypothyroidism--on armour thyroid  f/u  endocrinology----DR. LAITH VILLASEÑOR---- 4- w tloss and  home exercises advised 6-- repeat echo annually  7- dilated esophagus on imaging- advised GI consult and repeat endoscopy w/Dr Turk 8- S/P COVID WITH hyperactive airways disease-----continue prednisone 15 mg p.o. daily and then decrease to 10 mg after a week take a course of Augmentin ----obtain chest x-ray  n------    f/u in 6m  ------Thanks for allowing  me to participate  in the care of this patient.  Patient at this time  will follow  the above mentioned recommendations and return back for follow up visit. If you have any questions  I can be reached  at  [phone].    Malu Villaseñor MD, [facility]

## 2024-09-05 NOTE — HISTORY OF PRESENT ILLNESS
[Never] : never [Stable] : are stable [None] : ~He/She~ has no significant interval events [Difficulty Breathing During Exertion] : stable dyspnea on exertion [Feelings Of Weakness On Exertion] : stable exercise intolerance [Cough] : denies coughing [Wheezing] : denies wheezing [Regional Soft Tissue Swelling Both Lower Extremities] : denies lower extremity edema [Chest Pain Or Discomfort] : denies chest pain [Fever] : denies fever [0  -  Nothing at all] : 0, nothing at all [Date: ___] : was performed [unfilled] [TextBox_4] : 62 yo  F with scleroderma ( diagnosed 2008, follows with Dr. Goldberg, not on any treatment), HTN, sickle cell trait here for initial evaluation. ---SHE USED TO F/U  WITH DR PATIÑO  BEFORE  BUT HAS NOT FOLLOWWED UP WITH HIM  FOR A WHILE-   Her scleroderma has been associated with skin manifestations including digital ulcers and Raynaud's, GERD , and now possible pulmonary manifestations. She takes nifedipine for raynaud's and vitamins. She reports pedal edema occasionally. denies orthopnea, PND, cough. -----H/O ANTI ENDOMYSIAL ANTIBODIES POSITIVE?CELAIC ---------------------------SEEING  A 'HOLISTIC IMMUNOLOGIST'  She has LIMITED exercise capacity mainly due to joint pains and myalgias she reports.--------------- CT chest Jan 2014 - Minimal subpleural ground glass attenuation in b/l lower lung fields, rt lung calcified granuloma, RML 4mm nodule and 2.6 cm thyroid nodule.-------------  ct chest 12/2108 unchanged-   ct chest 10/2020 IMPRESSION: Bilateral lower lobe reticular and groundglass opacities with subpleural sparing are increased compared to prior studies. No traction bronchiectasis or honeycombing   ct chest 9/2021 IMPRESSION: Ground glass/reticular opacities in the peripheral portions of both lower lobes have not significantly changed when compared to previous exams.  CT CHEST jan 2023- RML subcent calcified granuloma, RUL 4mm nodule, 2.6cm left thyroid nodule  PFTs---------APR 2018------ FVC 2.54, FEV1 1.85, FEV1/FVC 73, ----------mildly reduced DLCO, normal spirometry , increased RV.  PFT 2022----------SPIROMETRY NORMAL, LUNG VOLUMES NORMAL -DECREASE IN DLCO  pft 3/2019 normal lung volumes, decreased  dlco   PSG 2021---  RDI 5.3  Of note, pt has not followed with Dr. martino for scleroderma. She reports using "holistic medicines" to treat her scleroderma.  PMD - Dr. Sin,   ECHO   -----OCT 2017-------- . Normal trileaflet aortic valve. 2. Increased relative wall thickness with normal left ventricular mass index, consistent with concentric left ventricular remodeling. 3. Normal left ventricular systolic function. No segmental wall motion abnormalities. 4. Normal right ventricular size and function.     CPAP 17087----OL NAGHAVI---HAD A CPAP STUDY AT HIS OFFICE AND WAS TOLD SHE SHOULD BE ON CPAP 12 CM OF H2O------  echo 7/2020 normal study  ECHO 2023 est pasp 33mmhg, mild MR  -----9/2017---ENDOSCOPY --SCHATZKI'S  RING, , ESOPHAGEAL OBSTRUCTION.[ DR SUZANNE GUERIN]  oct 2017 doing well-mild ORTIZ and mild dry cough declines influenza vac   nov 2020   Scleroderma- follows rheum Dr Goldberg. -FEELS  THE SAME  -  Hypothyroid--  ON ARMOUR THYROID] Follows endo Dr Dionne De Paz  JUNE 2021--FEELS BETTER ON CPAP 12 AND  NEBULIZER--------NEEDS CT CHEST---   ECHO DR PRATHER  10/2021 psg w/AHI 5  11/2021 reports covid virus  approx 10/10  "allergy symptoms" loss of smell and taste but overall symptoms were mild d/t fully vaccinated  Now feels good , no resp symptoms--- results of the sleep study were discussed she has minimal sleep apnea----  feb 2022--------feels  better----- pft  stable---  6 mwt -2022 ---   425 meters--repeat echo laer this year---fu with dr goldberg for scleroderma--on armour thyroid june 2022---    -fu with dr goldberg for scleroderma--on armour thyroid  -----complaining of sore throat we will do a nasal swab prednisone Z-Messi ------ she will call us in a week to let us know how she is doing ------follow-up in 3 months    12/2022  scleroderma/ild--resp wise stable, follows rheum Dr Goldberg URI last month-treated w/zpack, medrol and nebs by PMD hypothyroid- on armour thyroid- follows endo Dr Dionne De Paz   4/2023 scleroderma/ild--resp wise stable, follows rheum Dr Goldberg hypothyroid- on armour thyroid- follows endo Dr Dionne De Paz  9/2023 PFT normal lung volumes- decreased dlco  9/2023 scleroderma/ild--resp wise stable except for minimal occasional dry cough--- in the past have talked about treating it with an immunosuppressive agent but she has not been amenable to that----pft ----with mild decrease in DLCO s her PFTs are stable he feels she is stable with normal lung mechanics with decreased DLCO------- Follow rheum Dr Goldberg- last appt approx 6 m ago--- needs to follow-up with her rheumatologist hypothyroid managed by endo Dr Dionne De Paz  3/2024 ct chest LUNGS AND AIRWAYS: Patent central airways.  Mild peripheral reticular/ground glass opacities at the bilateral bases with subpleural sparing, unchanged. Stable 3 mm nodule in the left lower lobe (2:87). Small scattered calcified granulomas. PLEURA: No pleural effusion.  4/2024  scleroderma/ild--resp wise stable  Follow rheum Dr Goldberg-  -CT SCAN 3/2024-- STABLE  7/2024 recovering from covid on low-dose prednisone and has taken Z-Messi--- still dry cough -----will increase prednisone to 15 for a week and then decrease it back to 10 give her a course of Augmentin --she was outside the window for Paxlovid when she was diagnosed as she was having symptoms for more than a week before she had the nasal swab -------  9/2024 scleroderma/ILD PFT today mild restriction, decreased dlco saw PMD recently- had ai done and was started on advair/nebs as her braething arun "was not good" -report unavailable reports some cough w/change in weather and hoarsness since starting advair [Oxygen] : the patient uses no supplemental oxygen [de-identified] : FVC 72, FEV1 71, FEV1/FVC 78, , DLCO 72

## 2024-09-05 NOTE — END OF VISIT
[FreeTextEntry3] :   I, Dr. Malu De Paz  personally performed the evaluation and management (E/M) services for this established patient who presents today with (a) new problem(s)/exacerbation of (an) existing condition(s). That E/M includes conducting the clinically appropriate interval history &/or exam, assessing all new/exacerbated conditions, and establishing a new plan of care. Today, my ANGELITA, Kirsten Morelos NP, , was here to observe my evaluation and management service for this new problem/exacerbated condition and follow the plan of care established by me going forward. [Time Spent: ___ minutes] : I have spent [unfilled] minutes of time on the encounter which excludes teaching and separately reported services.

## 2025-01-14 ENCOUNTER — APPOINTMENT (OUTPATIENT)
Dept: PULMONOLOGY | Facility: CLINIC | Age: 65
End: 2025-01-14
Payer: COMMERCIAL

## 2025-01-14 VITALS
WEIGHT: 173 LBS | TEMPERATURE: 97.7 F | HEIGHT: 67 IN | OXYGEN SATURATION: 100 % | HEART RATE: 78 BPM | DIASTOLIC BLOOD PRESSURE: 75 MMHG | BODY MASS INDEX: 27.15 KG/M2 | SYSTOLIC BLOOD PRESSURE: 148 MMHG

## 2025-01-14 PROCEDURE — 99214 OFFICE O/P EST MOD 30 MIN: CPT

## 2025-01-15 RX ORDER — FLUTICASONE PROPIONATE AND SALMETEROL XINAFOATE 115; 21 UG/1; UG/1
115-21 AEROSOL, METERED RESPIRATORY (INHALATION) TWICE DAILY
Qty: 1 | Refills: 2 | Status: DISCONTINUED | COMMUNITY
Start: 2025-01-14 | End: 2025-01-15

## 2025-01-15 RX ORDER — BUDESONIDE AND FORMOTEROL FUMARATE DIHYDRATE 80; 4.5 UG/1; UG/1
80-4.5 AEROSOL RESPIRATORY (INHALATION)
Qty: 1 | Refills: 0 | Status: ACTIVE | COMMUNITY
Start: 2025-01-15 | End: 1900-01-01

## 2025-04-01 ENCOUNTER — APPOINTMENT (OUTPATIENT)
Dept: GASTROENTEROLOGY | Facility: CLINIC | Age: 65
End: 2025-04-01

## 2025-04-01 VITALS
HEIGHT: 67.5 IN | BODY MASS INDEX: 26.99 KG/M2 | SYSTOLIC BLOOD PRESSURE: 130 MMHG | RESPIRATION RATE: 16 BRPM | DIASTOLIC BLOOD PRESSURE: 68 MMHG | HEART RATE: 71 BPM | WEIGHT: 174 LBS | OXYGEN SATURATION: 100 %

## 2025-04-01 DIAGNOSIS — R13.19 OTHER DYSPHAGIA: ICD-10-CM

## 2025-04-01 PROCEDURE — 99205 OFFICE O/P NEW HI 60 MIN: CPT

## 2025-05-15 ENCOUNTER — NON-APPOINTMENT (OUTPATIENT)
Age: 65
End: 2025-05-15

## 2025-05-16 ENCOUNTER — NON-APPOINTMENT (OUTPATIENT)
Age: 65
End: 2025-05-16

## 2025-06-04 ENCOUNTER — APPOINTMENT (OUTPATIENT)
Dept: RADIOLOGY | Facility: HOSPITAL | Age: 65
End: 2025-06-04
Payer: COMMERCIAL

## 2025-06-04 ENCOUNTER — TRANSCRIPTION ENCOUNTER (OUTPATIENT)
Age: 65
End: 2025-06-04

## 2025-06-04 ENCOUNTER — OUTPATIENT (OUTPATIENT)
Dept: OUTPATIENT SERVICES | Facility: HOSPITAL | Age: 65
LOS: 1 days | End: 2025-06-04

## 2025-06-04 DIAGNOSIS — R13.19 OTHER DYSPHAGIA: ICD-10-CM

## 2025-06-04 PROCEDURE — 74220 X-RAY XM ESOPHAGUS 1CNTRST: CPT | Mod: 26

## 2025-06-06 ENCOUNTER — TRANSCRIPTION ENCOUNTER (OUTPATIENT)
Age: 65
End: 2025-06-06

## 2025-06-18 ENCOUNTER — TRANSCRIPTION ENCOUNTER (OUTPATIENT)
Age: 65
End: 2025-06-18

## 2025-06-18 ENCOUNTER — RESULT REVIEW (OUTPATIENT)
Age: 65
End: 2025-06-18

## 2025-06-18 ENCOUNTER — APPOINTMENT (OUTPATIENT)
Dept: GASTROENTEROLOGY | Facility: HOSPITAL | Age: 65
End: 2025-06-18

## 2025-06-18 ENCOUNTER — OUTPATIENT (OUTPATIENT)
Dept: OUTPATIENT SERVICES | Facility: HOSPITAL | Age: 65
LOS: 1 days | End: 2025-06-18
Payer: COMMERCIAL

## 2025-06-18 VITALS
OXYGEN SATURATION: 100 % | HEART RATE: 65 BPM | RESPIRATION RATE: 16 BRPM | SYSTOLIC BLOOD PRESSURE: 150 MMHG | DIASTOLIC BLOOD PRESSURE: 70 MMHG

## 2025-06-18 VITALS
HEIGHT: 68 IN | DIASTOLIC BLOOD PRESSURE: 79 MMHG | OXYGEN SATURATION: 95 % | SYSTOLIC BLOOD PRESSURE: 160 MMHG | HEART RATE: 80 BPM | RESPIRATION RATE: 15 BRPM | TEMPERATURE: 98 F | WEIGHT: 173.94 LBS

## 2025-06-18 DIAGNOSIS — R13.19 OTHER DYSPHAGIA: ICD-10-CM

## 2025-06-18 PROBLEM — K22.10 ESOPHAGEAL ULCER: Status: ACTIVE | Noted: 2025-06-18

## 2025-06-18 PROCEDURE — 43239 EGD BIOPSY SINGLE/MULTIPLE: CPT | Mod: 59

## 2025-06-18 PROCEDURE — 43249 ESOPH EGD DILATION <30 MM: CPT

## 2025-06-18 PROCEDURE — 43239 EGD BIOPSY SINGLE/MULTIPLE: CPT

## 2025-06-18 PROCEDURE — 88305 TISSUE EXAM BY PATHOLOGIST: CPT

## 2025-06-18 PROCEDURE — C1726: CPT

## 2025-06-18 PROCEDURE — 88305 TISSUE EXAM BY PATHOLOGIST: CPT | Mod: 26

## 2025-06-18 DEVICE — CATH BLLN CRE 15-18MM: Type: IMPLANTABLE DEVICE | Status: FUNCTIONAL

## 2025-06-18 RX ORDER — ICOSAPENT ETHYL 500 MG/1
0 CAPSULE ORAL
Refills: 0 | DISCHARGE

## 2025-06-18 RX ORDER — LEVALBUTEROL HYDROCHLORIDE 1.25 MG/3ML
0 SOLUTION RESPIRATORY (INHALATION)
Refills: 0 | DISCHARGE

## 2025-06-18 RX ORDER — DILTIAZEM HYDROCHLORIDE 240 MG/1
1 TABLET, EXTENDED RELEASE ORAL
Refills: 0 | DISCHARGE

## 2025-06-18 RX ORDER — OMEPRAZOLE 40 MG/1
40 CAPSULE, DELAYED RELEASE ORAL TWICE DAILY
Qty: 60 | Refills: 2 | Status: ACTIVE | COMMUNITY
Start: 2025-06-18 | End: 1900-01-01

## 2025-06-18 RX ORDER — LOSARTAN POTASSIUM 100 MG/1
1 TABLET, FILM COATED ORAL
Refills: 0 | DISCHARGE

## 2025-06-18 NOTE — PRE PROCEDURE NOTE - PRE PROCEDURE EVALUATION
Attending Physician:              Ruperto Turk MD MSEd    Indication for Procedure      dysphagia          PAST MEDICAL & SURGICAL HISTORY:        See [employer] Note for further details  ALLERGIES:  No Known Allergies    HOME MEDICATIONS:      See All[employer] Note for further details    AICD/PPM: [ ] yes   [ x] no    PERTINENT LAB DATA:                      PHYSICAL EXAMINATION:    T(C): --  HR: --  BP: --  RR: --  SpO2: --    Constitutional: NAD  HEENT: PERRLA, EOMI,    Neck:  No JVD  Respiratory: CTAB/L  Cardiovascular: S1 and S2  Gastrointestinal: BS+, soft, NT/ND  Extremities: No peripheral edema  Neurological: A/O x 3, no focal deficits  Psychiatric: Normal mood, normal affect  Skin: No rashes    ASA Class: I [ ]  II [ ]  III [x ]  IV [ ]    COMMENTS:    The patient is a suitable candidate for the planned procedure unless box checked [ ]  No, explain:

## 2025-06-18 NOTE — PRE-ANESTHESIA EVALUATION ADULT - WEIGHT IN LBS
Push fluids intake.  Drink plenty of water.     Vitamin C 1,000 mg two times a day     Contact me or your PCP if any worsening (in general or coughing up infected looking) or for any new concerns as we discussed.   
173.9

## 2025-06-18 NOTE — ASU DISCHARGE PLAN (ADULT/PEDIATRIC) - FINANCIAL ASSISTANCE
[facility] provides services at a reduced cost to those who are determined to be eligible through [facility]’s financial assistance program. Information regarding [facility]’s financial assistance program can be found by going to [email or web address] or by calling [phone].

## 2025-06-19 LAB — SURGICAL PATHOLOGY STUDY: SIGNIFICANT CHANGE UP

## 2025-06-23 PROBLEM — I10 ESSENTIAL (PRIMARY) HYPERTENSION: Chronic | Status: ACTIVE | Noted: 2025-06-18

## 2025-06-23 PROBLEM — Z87.39 PERSONAL HISTORY OF OTHER DISEASES OF THE MUSCULOSKELETAL SYSTEM AND CONNECTIVE TISSUE: Chronic | Status: ACTIVE | Noted: 2025-06-18

## (undated) DEVICE — BALLOON US ENDO

## (undated) DEVICE — SENSOR O2 FINGER ADULT

## (undated) DEVICE — FOLEY HOLDER STATLOCK 2 WAY ADULT

## (undated) DEVICE — SOL INJ NS 0.9% 500ML 2 PORT

## (undated) DEVICE — TUBING SUCTION 20FT

## (undated) DEVICE — SUCTION YANKAUER NO CONTROL VENT

## (undated) DEVICE — TUBING SUCTION CONN 6FT STERILE

## (undated) DEVICE — TUBING IV SET GRAVITY 3Y 100" MACRO

## (undated) DEVICE — BITE BLOCK ADULT 20 X 27MM (GREEN)

## (undated) DEVICE — SYR ALLIANCE II INFLATION 60ML

## (undated) DEVICE — CATH IV SAFE BC 20G X 1.16" (PINK)

## (undated) DEVICE — PACK IV START WITH CHG

## (undated) DEVICE — CATH IV SAFE BC 22G X 1" (BLUE)